# Patient Record
Sex: MALE | Race: WHITE | Employment: FULL TIME | URBAN - METROPOLITAN AREA
[De-identification: names, ages, dates, MRNs, and addresses within clinical notes are randomized per-mention and may not be internally consistent; named-entity substitution may affect disease eponyms.]

---

## 2020-08-19 ENCOUNTER — APPOINTMENT (OUTPATIENT)
Dept: INTERPRETER SERVICES | Facility: CLINIC | Age: 40
End: 2020-08-19
Payer: COMMERCIAL

## 2020-08-25 ENCOUNTER — OFFICE VISIT (OUTPATIENT)
Dept: FAMILY MEDICINE | Facility: CLINIC | Age: 40
End: 2020-08-25
Payer: COMMERCIAL

## 2020-08-25 ENCOUNTER — APPOINTMENT (OUTPATIENT)
Dept: INTERPRETER SERVICES | Facility: CLINIC | Age: 40
End: 2020-08-25
Payer: COMMERCIAL

## 2020-08-25 VITALS
HEART RATE: 79 BPM | TEMPERATURE: 98.2 F | WEIGHT: 224 LBS | BODY MASS INDEX: 31.36 KG/M2 | SYSTOLIC BLOOD PRESSURE: 121 MMHG | OXYGEN SATURATION: 97 % | DIASTOLIC BLOOD PRESSURE: 78 MMHG | HEIGHT: 71 IN

## 2020-08-25 DIAGNOSIS — Z00.00 ROUTINE GENERAL MEDICAL EXAMINATION AT A HEALTH CARE FACILITY: Primary | ICD-10-CM

## 2020-08-25 DIAGNOSIS — R10.13 EPIGASTRIC PAIN: ICD-10-CM

## 2020-08-25 DIAGNOSIS — N50.3 CYST OF EPIDIDYMIS: ICD-10-CM

## 2020-08-25 LAB
ALBUMIN SERPL-MCNC: 4 G/DL (ref 3.4–5)
ALP SERPL-CCNC: 77 U/L (ref 40–150)
ALT SERPL W P-5'-P-CCNC: 63 U/L (ref 0–70)
ANION GAP SERPL CALCULATED.3IONS-SCNC: 7 MMOL/L (ref 3–14)
AST SERPL W P-5'-P-CCNC: 49 U/L (ref 0–45)
BASOPHILS # BLD AUTO: 0 10E9/L (ref 0–0.2)
BASOPHILS NFR BLD AUTO: 0.3 %
BILIRUB SERPL-MCNC: 0.4 MG/DL (ref 0.2–1.3)
BUN SERPL-MCNC: 18 MG/DL (ref 7–30)
CALCIUM SERPL-MCNC: 8.7 MG/DL (ref 8.5–10.1)
CHLORIDE SERPL-SCNC: 109 MMOL/L (ref 94–109)
CHOLEST SERPL-MCNC: 168 MG/DL
CO2 SERPL-SCNC: 25 MMOL/L (ref 20–32)
CREAT SERPL-MCNC: 1.13 MG/DL (ref 0.66–1.25)
DIFFERENTIAL METHOD BLD: ABNORMAL
EOSINOPHIL # BLD AUTO: 0.2 10E9/L (ref 0–0.7)
EOSINOPHIL NFR BLD AUTO: 2.4 %
ERYTHROCYTE [DISTWIDTH] IN BLOOD BY AUTOMATED COUNT: 12.6 % (ref 10–15)
GFR SERPL CREATININE-BSD FRML MDRD: 81 ML/MIN/{1.73_M2}
GLUCOSE SERPL-MCNC: 85 MG/DL (ref 70–99)
HCT VFR BLD AUTO: 39.7 % (ref 40–53)
HDLC SERPL-MCNC: 41 MG/DL
HGB BLD-MCNC: 14.3 G/DL (ref 13.3–17.7)
LDLC SERPL CALC-MCNC: 54 MG/DL
LIPASE SERPL-CCNC: 125 U/L (ref 73–393)
LYMPHOCYTES # BLD AUTO: 2 10E9/L (ref 0.8–5.3)
LYMPHOCYTES NFR BLD AUTO: 28.4 %
MCH RBC QN AUTO: 29.9 PG (ref 26.5–33)
MCHC RBC AUTO-ENTMCNC: 36 G/DL (ref 31.5–36.5)
MCV RBC AUTO: 83 FL (ref 78–100)
MONOCYTES # BLD AUTO: 0.5 10E9/L (ref 0–1.3)
MONOCYTES NFR BLD AUTO: 6.9 %
NEUTROPHILS # BLD AUTO: 4.3 10E9/L (ref 1.6–8.3)
NEUTROPHILS NFR BLD AUTO: 62 %
NONHDLC SERPL-MCNC: 127 MG/DL
PLATELET # BLD AUTO: 269 10E9/L (ref 150–450)
POTASSIUM SERPL-SCNC: 3.9 MMOL/L (ref 3.4–5.3)
PROT SERPL-MCNC: 7.5 G/DL (ref 6.8–8.8)
RBC # BLD AUTO: 4.78 10E12/L (ref 4.4–5.9)
SODIUM SERPL-SCNC: 141 MMOL/L (ref 133–144)
TRIGL SERPL-MCNC: 367 MG/DL
WBC # BLD AUTO: 7 10E9/L (ref 4–11)

## 2020-08-25 PROCEDURE — 99385 PREV VISIT NEW AGE 18-39: CPT | Performed by: FAMILY MEDICINE

## 2020-08-25 PROCEDURE — 80061 LIPID PANEL: CPT | Performed by: FAMILY MEDICINE

## 2020-08-25 PROCEDURE — 85025 COMPLETE CBC W/AUTO DIFF WBC: CPT | Performed by: FAMILY MEDICINE

## 2020-08-25 PROCEDURE — 83690 ASSAY OF LIPASE: CPT | Performed by: FAMILY MEDICINE

## 2020-08-25 PROCEDURE — 36415 COLL VENOUS BLD VENIPUNCTURE: CPT | Performed by: FAMILY MEDICINE

## 2020-08-25 PROCEDURE — 80053 COMPREHEN METABOLIC PANEL: CPT | Performed by: FAMILY MEDICINE

## 2020-08-25 SDOH — HEALTH STABILITY: MENTAL HEALTH: HOW MANY STANDARD DRINKS CONTAINING ALCOHOL DO YOU HAVE ON A TYPICAL DAY?: 3 OR 4

## 2020-08-25 SDOH — HEALTH STABILITY: MENTAL HEALTH: HOW OFTEN DO YOU HAVE A DRINK CONTAINING ALCOHOL?: 2-3 TIMES A WEEK

## 2020-08-25 ASSESSMENT — PAIN SCALES - GENERAL: PAINLEVEL: SEVERE PAIN (6)

## 2020-08-25 ASSESSMENT — MIFFLIN-ST. JEOR: SCORE: 1957.31

## 2020-08-25 NOTE — PATIENT INSTRUCTIONS
Preventive Health Recommendations  Male Ages 26 - 39    Yearly exam:             See your health care provider every year in order to  o   Review health changes.   o   Discuss preventive care.    o   Review your medicines if your doctor has prescribed any.    You should be tested each year for STDs (sexually transmitted diseases), if you re at risk.     After age 35, talk to your provider about cholesterol testing. If you are at risk for heart disease, have your cholesterol tested at least every 5 years.     If you are at risk for diabetes, you should have a diabetes test (fasting glucose).  Shots: Get a flu shot each year. Get a tetanus shot every 10 years.     Nutrition:    Eat at least 5 servings of fruits and vegetables daily.     Eat whole-grain bread, whole-wheat pasta and brown rice instead of white grains and rice.     Get adequate Calcium and Vitamin D.     Lifestyle    Exercise for at least 150 minutes a week (30 minutes a day, 5 days a week). This will help you control your weight and prevent disease.     Limit alcohol to one drink per day.     No smoking.     Wear sunscreen to prevent skin cancer.     See your dentist every six months for an exam and cleaning.     Patient Education     Dolor epigástrico (causa incierta)     El dolor epigástrico es dolor en la parte superior del abdomen. Puede ser un signo de enfermedad. Las causas frecuentes incluyen las siguientes:    Reflujo ácido (el ácido del estómago sube hacia el esófago)    Gastritis (irritación del revestimiento del estómago) La mayoría de las veces, es provocada por la administración de aspirinas o antiinflamatorios no esteroides (SANYA) corey ibuprofeno, por la bacteria H. Pylori o por el consumo frecuente de alcohol.    Úlcera péptica    Inflamación del páncreas    Cálculos biliares    Infección en la vesícula biliar  El dolor puede ser sordo o quemante. Puede irradiarse hacia el pecho o la espalda. Puede curz otros síntomas, corey eructos,  sensación de hinchazón en el estómago, cólicos o dolor de hambre. Puede cruz pérdida de peso o poco apetito, náuseas o vómitos.  Puesto que el diagnóstico de trujillo dolor es incierto, algunas veces se necesitarán pruebas adicionales. En algunos casos el médico tratará la enfermedad más probable para casie si mejora antes de hacer pruebas adicionales.  Cuidados en el hogar  Medicamentos    Los antiácidos ayudan a neutralizar los ácidos típicos del estómago. Si no le gusta la fórmula líquida, puede probar las formas masticables. Podrá notar que unos le funcionan mejor que otros. El uso excesivo puede provocar diarrea o estreñimiento.    Los bloqueadores de ácido (bloqueadores H2) disminuyen la producción de ácido. Ejemplos de esto son cimetidina, famotidina y ranitidina.    Los inhibidores de ácido o inhibidores de la bomba de protones (PPI) disminuyen la producción de ácido de manera diferente de los bloqueadores. Usted puede descubrir que funcionan mejor, chu tardan un poco más en surtir efecto.  Algunos de ellos son omeprazol, lansoprazol, pantoprazol, rabeprazol y esomeprazol. Muchos de estos medicamentos son de venta julissa o se puede conseguir trujillo fórmula genérica.    [place] un antiácido de 30 a 60 minutos después de comer y a la hora de acostarse, chu no a la misma hora que un bloqueador de ácido.    Trate de no rhonda antiinflamatorios no esteroides (SANYA). La aspirina también puede provocar problemas, chu si la antonio para el corazón u otra razón médica, hable con trujillo médico antes de suspenderla; usted no quisiera causar un problema peor, corey un ataque cardíaco o un accidente cerebrovascular.  Alimentación    Si ciertos alimentos parecen provocar dolor, trate de evitarlos. Los síntomas de la gastritis pueden empeorar con ciertas comidas. Limite o evite las comidas grasosas, fritas o muy condimentadas, así corey el café, el chocolate, las mentas y los alimentos muy ácidos corey los tomates o las frutas y los jugos  cítricos (naranja, toronja, jose).    Coma despacio y mastique karen antes de tragar. Los síntomas de la gastritis pueden empeorar con ciertos alimentos.    Evite beber alcohol. [place] puede irritar el estómago.    No ingiera cafeína ni tabaco. [place] puede retrasar la recuperación y empeorar el problema.    Trate de comer comidas pequeñas con bocadillos entre cate y otra.    No coma otto 2 o 3 horas antes de acostarse.    [place] la cabecera de la cama si sufre síntomas otto la noche. [place] eduar que los ácidos se acumulen en el esófago.  Visita de control  Programe cate visita de control con trujillo proveedor de atención médica, o según lo que se le haya indicado.  Cuándo buscar atención médica  Llame a trujillo proveedor de atención médica de inmediato ante cualquiera de los siguientes síntomas:    Dolor de estómago que empeora o se mueve hacia el lado derecho inferior del abdomen    Aparece dolor de pecho, o si el dolor empeora o se extiende hacia el pecho, la espalda, el manish, el hombro o el brazo    Vómitos frecuentes (no puede retener líquidos en el estómago)    Jerry en las heces o en el vómito (color rojizo o negruzco)    Siente debilidad o mareos, se desmaya o tiene problemas para respirar    Fiebre de 100,4  F (38  C) o más francine, o según lo que le haya indicado trujillo proveedor de atención médica    Hinchazón del abdomen  Date Last Reviewed: 3/1/2018    [id number] [employer]. [address] [place] [address], [place] [address]. Todos los derechos reservados. Esta información no pretende sustituir la atención médica profesional. Sólo trujillo médico puede diagnosticar y tratar un problema de shari.           Patient Education     Epididimitis [Epididymitis]  El dolor y la hinchazón en trujillo escroto se deben a cate inflamación del epidídimo, el cual es un pequeño saco que se encuentra junto al testículo y es donde se almacena la esperma. A menudo, se debe a cate infección. En hombres sexualmente activos, suele deberse a cate  enfermedad de transmisión sexual (ETS), katie corey la clamidiosis o la gonorrea. En los niños y los hombres mayores que no son sexualmente activos, esta enfermedad se debe a bacterias provenientes de la vejiga o la glándula prostática (no se trata de cate enfermedad de transmisión sexual).  Los síntomas pueden comenzar con dolor en la parte baja del abdomen o con dolor en la parte baja de la espalda que se esparce hacia el escroto. Por lo general, sólo un lado del cuerpo se ve afectado. El testículo y el escroto se hinchan y eso causa mucho dolor. También puede tener fiebre, y ardor al orinar. En ocasiones, puede cruz secreción en el pene.  El tratamiento consiste en antibióticos, antiinflamatorios y calmantes (analgésicos). Debería sentir alivio de los síntomas en los primeros días de tratamiento, chu pasarán algunas semanas antes de que la hinchazón y el malestar desaparezcan. Si se sospecha que cate enfermedad de transmisión sexual puede ser la causa, trujillo betsey también debe recibir tratamiento.  Cuidados En [place]:    El escroto debe estar siempre karen sostenido: Al acostarse, coloque cate toalla enrollada debajo del escroto. Para caminar, use un suspensorio atlético o colóquese dos calzoncillos juntos.    Para aliviar el dolor, aplique compresas de hielo (hielo en cate bolsa plástica, envuelta en cate toalla) sobre la geovany inflamada.    Puede usar acetaminofén (Tylenol) o ibuprofeno (Motrin o Advil) para controlar el dolor, a menos que le hayan recetado otro medicamento. [ NOTA : Si tiene cate enfermedad hepática o renal crónica, o ha tenido alguna vez cate úlcera estomacal o sangrado gastrointestinal, consulte con trujillo médico antes de rhonda estos medicamentos.]    Descanse en la cama hasta que la fiebre, el dolor y la hinchazón se detengan. La hinchazón puede tardar varias semanas en desaparecer. No tome alcohol, té, café ni bebidas gaseosas, dado que eso podría empeorar los síntomas.    Evite el estreñimiento, dado  que lo obligará a hacer más esfuerzo y eso aumentará el dolor. Para evitarlo, coma alimentos laxantes naturales, tales corey ciruelas secas, frutas frescas y cereales integrales. De ser necesario, puede usar un laxante suave de venta sin receta (leche de magnesia) para tratar el estreñimiento. También puede usar aceite mineral para que la materia fecal sea siempre blanda.    Evite mantener relaciones sexuales hasta cruz terminado el tratamiento y que todos los síntomas hayan desaparecido.    [place] el medicamento según le hayan indicado. No se saltee ninguna dosis ni deje de tomarlo antes de terminarlo aunque se sienta mejor.  Visita de control  con trujillo médico, un urólogo o según le indique nuestro personal médico, para comprobar que usted esté respondiendo adecuadamente al tratamiento. Si le tomaron cate muestra para hacer un cultivo, puede llamarnos para obtener los resultados. Cate prueba de cultivo sirve para asegurar que está tomando el antibiótico correcto.  Busque Prontamente Atención Médica  si algo de lo siguiente ocurre:    Fiebre superior a los 100.4  F (38.0  C) después de joya días de tratamiento.    [place] hinchazón o dolor en el testículo después de cruz comenzado el tratamiento.    Siente más presión o dolor en la vejiga.    No ha podido orinar en 8 horas.  Date Last Reviewed: 9/1/2016 2000-2019 [employer]. [address] [place] [address], [place] [address]. Todos los derechos reservados. Esta información no pretende sustituir la atención médica profesional. Sólo trujillo médico puede diagnosticar y tratar un problema de shari.

## 2020-08-25 NOTE — PROGRESS NOTES
3  SUBJECTIVE:   CC: Leonidas Hawk is an 40 year old male who presents for preventive health visit.     Healthy Habits:    Do you get at least three servings of calcium containing foods daily (dairy, green leafy vegetables, etc.)? No    Amount of exercise or daily activities, outside of work: 0 day(s) per week    Problems taking medications regularly No    Medication side effects: No    Have you had an eye exam in the past two years? no    Do you see a dentist twice per year? no    Do you have sleep apnea, excessive snoring or daytime drowsiness?Snoring a lot, sleepy    Abdominal/Flank Pain  Duration of complaint: 5 years, middle of stomach . Very bad sharp pain, feels like it radiates into his back and spine hurts. Does not take anything for this, does not know what to take. No testing has been done for abdomen.    Description:   Character: Sharp  Location: epigastric region  Radiation: None and Back  Intensity: severe  Progression of Symptoms:  worsening  Accompanying Signs & Symptoms:  Fever/Chills: no   Gas/Bloating: no   Nausea: YES  Vomitting: YES  Diarrhea: no   Dysuria or Hematuria: no   History:   Trauma: no   Previous similar pain: YES- for about 5 years, becoming more common, has never seen a doctor for this.   Previous tests done: none  Precipitating factors:   Does the pain change with:     Food: YES     BM: no     Urination: no   Alleviating factors: none  Therapies Tried and outcome: none  LMP:  not applicable    Today's PHQ-2 Score:   PHQ-2 ( 1999 [employer]) 8/25/2020   Q1: Little interest or pleasure in doing things 0   Q2: Feeling down, depressed or hopeless 0   PHQ-2 Score 0       Abuse: Current or Past(Physical, Sexual or Emotional)- No  Do you feel safe in your environment? Yes        Social History     Tobacco Use     Smoking status: Current Some Day Smoker     Packs/day: 0.10     Smokeless tobacco: Never Used   Substance Use Topics     Alcohol use: Yes     Frequency: 2-3 times a week      Drinks per session: 3 or 4     If you drink alcohol do you typically have >3 drinks per day or >7 drinks per week? Saundra Costello MA    Patient was scheduled for routine physical today.  We attempted to do all of that in addition to dealing with his chronic abdominal pain as noted above.  He also had a list of other concerns but due to the brevity of the visit and the need to work through an  some of those were deferred to a future visit.    The abdominal pain is epigastric.  It may occur once per week is very sharp in nature.  It generally does not wake him up at night.  Is occasional vomiting.  His weight is remained stable over this time.  He has occasional diarrhea and feels that there may be some blood in the stool but it is difficult to characterize.  Eating seems to potentially increase the discomfort.  He is currently on some amoxicillin and will be finishing up that shortly after having had an infected tooth removed in [place] last week.  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    He also has a lump on the left testicle that is intermittently painful.  He is also interested in a vasectomy.    He also mention some skin changes on his face.  I see some acne-like changes on his forehead but I do not really appreciate any other rashes or skin changes in the lower face where he reports some changes as well.                        Last PSA: No results found for: PSA    Reviewed orders with patient. Reviewed health maintenance and updated orders accordingly - Yes  There is no problem list on file for this patient.    Past Surgical History:   Procedure Laterality Date     APPENDECTOMY  2000     DENTAL SURGERY         Social History     Tobacco Use     Smoking status: Current Some Day Smoker     Packs/day: 0.10     Smokeless tobacco: Never Used   Substance Use Topics     Alcohol use: Yes     Frequency: 2-3 times a week      "Drinks per session: 3 or 4     Family History   Problem Relation Age of Onset     Cancer Mother         stomach, throat     Other - See Comments Father         hit by car and          Current Outpatient Medications   Medication Sig Dispense Refill     AMOXICILLIN PO Was prescribed this after dental procedure       omeprazole (PRILOSEC) 20 MG DR capsule Take 1 capsule (20 mg) by mouth daily 30 capsule 1     No Known Allergies    Reviewed and updated as needed this visit by clinical staff  Tobacco  Allergies  Meds  Problems  Med Hx  Surg Hx  Fam Hx  Soc Hx          Reviewed and updated as needed this visit by Provider  Tobacco  Meds  Problems  Med Hx  Surg Hx  Fam Hx  Soc Hx           ROS:  CONSTITUTIONAL: NEGATIVE for fever, chills, change in weight  INTEGUMENTARY/SKIN: NEGATIVE for worrisome rashes, moles or lesions  EYES: NEGATIVE for vision changes or irritation  ENT: NEGATIVE for ear, mouth and throat problems  RESP: NEGATIVE for significant cough or SOB  CV: NEGATIVE for chest pain, palpitations or peripheral edema  GI: NEGATIVE for nausea, abdominal pain, heartburn, or change in bowel habits   male: negative for dysuria, hematuria, decreased urinary stream, erectile dysfunction, urethral discharge  MUSCULOSKELETAL: NEGATIVE for significant arthralgias or myalgia  NEURO: NEGATIVE for weakness, dizziness or paresthesias  PSYCHIATRIC: NEGATIVE for changes in mood or affect    OBJECTIVE:   /78 (BP Location: Right arm, Patient Position: Chair, Cuff Size: Adult Regular)   Pulse 79   Temp 98.2  F (36.8  C) (Oral)   Ht 1.81 m (5' 11.26\")   Wt 101.6 kg (224 lb)   SpO2 97%   BMI 31.01 kg/m    EXAM:  GENERAL: healthy, alert and no distress  EYES: Eyes grossly normal to inspection, PERRL and conjunctivae and sclerae normal  HENT: ear canals and TM's normal, nose and mouth without ulcers or lesions  NECK: no adenopathy, no asymmetry, masses, or scars and thyroid normal to palpation  RESP: " lungs clear to auscultation - no rales, rhonchi or wheezes  CV: regular rate and rhythm, normal S1 S2, no S3 or S4, no murmur, click or rub, no peripheral edema and peripheral pulses strong  ABDOMEN: tenderness epigastric, no organomegaly or masses, liver span normal to percussion, bowel sounds normal, no palpable or pulsatile masses, umbilicus normal and well-healed incision both midline and right lower quadrant consistent with his reported history of an appendectomy that was ruptured and required more aggressive surgical management.   (male): normal male genitalia without lesions or urethral discharge, no hernia   (male): testicles normal without atrophy or masses, no hernias, penis normal without urethral discharge and the lump of concern seems to correspond to an epididymal cyst.  MS: no gross musculoskeletal defects noted, no edema  SKIN: no suspicious lesions or rashes  NEURO: Normal strength and tone, mentation intact and speech normal  PSYCH: mentation appears normal, affect normal/bright    Diagnostic Test Results:  Labs reviewed in Epic    ASSESSMENT/PLAN:   1. Routine general medical examination at a health care facility  Routine health issues were briefly reviewed.  Information was given to him in French.  Lipid panel was checked today.  - Lipid panel reflex to direct LDL Non-fasting    2. Epigastric pain  I guess my concern would be an ulcer even though the history is not entirely typical.  Laboratory studies will be checked as noted.  May consider imaging in the form of ultrasound to evaluate the gallbladder although he was not tender over the gallbladder.  Initially have recommended starting Prilosec.  Short-term follow-up in a few weeks for recheck and review of the tests is recommended.  - CBC with platelets and differential  - Comprehensive metabolic panel (BMP + Alb, Alk Phos, ALT, AST, Total. Bili, TP)  - Lipase  - Helicobacter pylori Antigen Stool; Future  - omeprazole (PRILOSEC) 20 MG   "capsule; Take 1 capsule (20 mg) by mouth daily  Dispense: 30 capsule; Refill: 1    3. Cyst of epididymis  No testicular pathology was noted.  The area of discomfort seems to correspond to an epididymal cyst.      COUNSELING:  Reviewed preventive health counseling, as reflected in patient instructions       Regular exercise       Healthy diet/nutrition       Vision screening       Hearing screening    Estimated body mass index is 31.01 kg/m  as calculated from the following:    Height as of this encounter: 1.81 m (5' 11.26\").    Weight as of this encounter: 101.6 kg (224 lb).    Weight management plan: Discussed healthy diet and exercise guidelines     reports that he has been smoking. He has been smoking about 0.10 packs per day. He has never used smokeless tobacco.  Tobacco Cessation Action Plan: Information offered: Patient not interested at this time    Counseling Resources:  ATP IV Guidelines  Pooled Cohorts Equation Calculator  FRAX Risk Assessment  ICSI Preventive Guidelines  Dietary Guidelines for Americans, 2010  USDA's MyPlate  ASA Prophylaxis  Lung CA Screening    Hector Bunch MD  [facility]  "

## 2020-08-26 PROCEDURE — 87338 HPYLORI STOOL AG IA: CPT | Performed by: FAMILY MEDICINE

## 2020-08-27 DIAGNOSIS — R10.13 EPIGASTRIC PAIN: ICD-10-CM

## 2020-08-28 LAB — H PYLORI AG STL QL IA: NEGATIVE

## 2020-09-12 ENCOUNTER — APPOINTMENT (OUTPATIENT)
Dept: ULTRASOUND IMAGING | Facility: CLINIC | Age: 40
End: 2020-09-12
Attending: EMERGENCY MEDICINE
Payer: COMMERCIAL

## 2020-09-12 ENCOUNTER — HOSPITAL ENCOUNTER (INPATIENT)
Facility: CLINIC | Age: 40
LOS: 3 days | Discharge: HOME | End: 2020-09-15
Attending: EMERGENCY MEDICINE | Admitting: SURGERY
Payer: COMMERCIAL

## 2020-09-12 DIAGNOSIS — K80.10 CALCULUS OF GALLBLADDER WITH CHRONIC CHOLECYSTITIS WITHOUT OBSTRUCTION: Primary | ICD-10-CM

## 2020-09-12 DIAGNOSIS — Z20.822 COVID-19 RULED OUT BY LABORATORY TESTING: ICD-10-CM

## 2020-09-12 DIAGNOSIS — K80.71 CALCULUS OF GALLBLADDER AND BILE DUCT WITH OBSTRUCTION WITHOUT CHOLECYSTITIS: ICD-10-CM

## 2020-09-12 PROBLEM — K80.20 CHOLELITHIASIS: Status: ACTIVE | Noted: 2020-09-12

## 2020-09-12 LAB
ALBUMIN SERPL-MCNC: 4.4 G/DL (ref 3.4–5)
ALBUMIN UR-MCNC: 10 MG/DL
ALP SERPL-CCNC: 82 U/L (ref 40–150)
ALT SERPL W P-5'-P-CCNC: 72 U/L (ref 0–70)
ANION GAP SERPL CALCULATED.3IONS-SCNC: 8 MMOL/L (ref 3–14)
APPEARANCE UR: CLEAR
AST SERPL W P-5'-P-CCNC: 37 U/L (ref 0–45)
BASOPHILS # BLD AUTO: 0 10E9/L (ref 0–0.2)
BASOPHILS NFR BLD AUTO: 0.1 %
BILIRUB SERPL-MCNC: 0.5 MG/DL (ref 0.2–1.3)
BILIRUB UR QL STRIP: NEGATIVE
BUN SERPL-MCNC: 14 MG/DL (ref 7–30)
CALCIUM SERPL-MCNC: 9.1 MG/DL (ref 8.5–10.1)
CHLORIDE SERPL-SCNC: 106 MMOL/L (ref 94–109)
CO2 SERPL-SCNC: 24 MMOL/L (ref 20–32)
COLOR UR AUTO: YELLOW
CREAT SERPL-MCNC: 0.91 MG/DL (ref 0.66–1.25)
DIFFERENTIAL METHOD BLD: NORMAL
EOSINOPHIL # BLD AUTO: 0.1 10E9/L (ref 0–0.7)
EOSINOPHIL NFR BLD AUTO: 0.9 %
ERYTHROCYTE [DISTWIDTH] IN BLOOD BY AUTOMATED COUNT: 11.9 % (ref 10–15)
GFR SERPL CREATININE-BSD FRML MDRD: >90 ML/MIN/{1.73_M2}
GLUCOSE SERPL-MCNC: 109 MG/DL (ref 70–99)
GLUCOSE UR STRIP-MCNC: NEGATIVE MG/DL
HCT VFR BLD AUTO: 45.7 % (ref 40–53)
HGB BLD-MCNC: 16.5 G/DL (ref 13.3–17.7)
HGB UR QL STRIP: NEGATIVE
HYALINE CASTS #/AREA URNS LPF: 1 /LPF (ref 0–2)
IMM GRANULOCYTES # BLD: 0 10E9/L (ref 0–0.4)
IMM GRANULOCYTES NFR BLD: 0.2 %
INTERPRETATION ECG - MUSE: NORMAL
KETONES UR STRIP-MCNC: NEGATIVE MG/DL
LACTATE BLD-SCNC: 2.1 MMOL/L (ref 0.7–2)
LEUKOCYTE ESTERASE UR QL STRIP: NEGATIVE
LIPASE SERPL-CCNC: 120 U/L (ref 73–393)
LYMPHOCYTES # BLD AUTO: 1.7 10E9/L (ref 0.8–5.3)
LYMPHOCYTES NFR BLD AUTO: 18.4 %
MCH RBC QN AUTO: 30.2 PG (ref 26.5–33)
MCHC RBC AUTO-ENTMCNC: 36.1 G/DL (ref 31.5–36.5)
MCV RBC AUTO: 84 FL (ref 78–100)
MONOCYTES # BLD AUTO: 0.4 10E9/L (ref 0–1.3)
MONOCYTES NFR BLD AUTO: 4.1 %
MUCOUS THREADS #/AREA URNS LPF: PRESENT /LPF
NEUTROPHILS # BLD AUTO: 7 10E9/L (ref 1.6–8.3)
NEUTROPHILS NFR BLD AUTO: 76.3 %
NITRATE UR QL: NEGATIVE
NRBC # BLD AUTO: 0 10*3/UL
NRBC BLD AUTO-RTO: 0 /100
PH UR STRIP: 6.5 PH (ref 5–7)
PLATELET # BLD AUTO: 280 10E9/L (ref 150–450)
POTASSIUM SERPL-SCNC: 4 MMOL/L (ref 3.4–5.3)
PROT SERPL-MCNC: 8.5 G/DL (ref 6.8–8.8)
RBC # BLD AUTO: 5.47 10E12/L (ref 4.4–5.9)
RBC #/AREA URNS AUTO: 2 /HPF (ref 0–2)
SODIUM SERPL-SCNC: 138 MMOL/L (ref 133–144)
SOURCE: ABNORMAL
SP GR UR STRIP: 1.02 (ref 1–1.03)
TROPONIN I SERPL-MCNC: <0.015 UG/L (ref 0–0.04)
UROBILINOGEN UR STRIP-MCNC: NORMAL MG/DL (ref 0–2)
WBC # BLD AUTO: 9.1 10E9/L (ref 4–11)
WBC #/AREA URNS AUTO: 1 /HPF (ref 0–5)

## 2020-09-12 PROCEDURE — 83690 ASSAY OF LIPASE: CPT | Performed by: EMERGENCY MEDICINE

## 2020-09-12 PROCEDURE — 25800030 ZZH RX IP 258 OP 636: Performed by: STUDENT IN AN ORGANIZED HEALTH CARE EDUCATION/TRAINING PROGRAM

## 2020-09-12 PROCEDURE — 96376 TX/PRO/DX INJ SAME DRUG ADON: CPT | Performed by: EMERGENCY MEDICINE

## 2020-09-12 PROCEDURE — 25000128 H RX IP 250 OP 636: Performed by: EMERGENCY MEDICINE

## 2020-09-12 PROCEDURE — 96375 TX/PRO/DX INJ NEW DRUG ADDON: CPT | Performed by: EMERGENCY MEDICINE

## 2020-09-12 PROCEDURE — 96361 HYDRATE IV INFUSION ADD-ON: CPT | Performed by: EMERGENCY MEDICINE

## 2020-09-12 PROCEDURE — 25800030 ZZH RX IP 258 OP 636: Performed by: EMERGENCY MEDICINE

## 2020-09-12 PROCEDURE — 84484 ASSAY OF TROPONIN QUANT: CPT | Performed by: EMERGENCY MEDICINE

## 2020-09-12 PROCEDURE — 12000001 ZZH R&B MED SURG/OB UMMC

## 2020-09-12 PROCEDURE — 80053 COMPREHEN METABOLIC PANEL: CPT | Performed by: EMERGENCY MEDICINE

## 2020-09-12 PROCEDURE — 81001 URINALYSIS AUTO W/SCOPE: CPT | Performed by: EMERGENCY MEDICINE

## 2020-09-12 PROCEDURE — 85025 COMPLETE CBC W/AUTO DIFF WBC: CPT | Performed by: EMERGENCY MEDICINE

## 2020-09-12 PROCEDURE — 96374 THER/PROPH/DIAG INJ IV PUSH: CPT | Performed by: EMERGENCY MEDICINE

## 2020-09-12 PROCEDURE — 83605 ASSAY OF LACTIC ACID: CPT | Performed by: EMERGENCY MEDICINE

## 2020-09-12 PROCEDURE — 25000128 H RX IP 250 OP 636: Performed by: STUDENT IN AN ORGANIZED HEALTH CARE EDUCATION/TRAINING PROGRAM

## 2020-09-12 PROCEDURE — 76700 US EXAM ABDOM COMPLETE: CPT

## 2020-09-12 PROCEDURE — C9803 HOPD COVID-19 SPEC COLLECT: HCPCS | Performed by: EMERGENCY MEDICINE

## 2020-09-12 PROCEDURE — 99285 EMERGENCY DEPT VISIT HI MDM: CPT | Mod: Z6 | Performed by: EMERGENCY MEDICINE

## 2020-09-12 PROCEDURE — 99285 EMERGENCY DEPT VISIT HI MDM: CPT | Mod: 25 | Performed by: EMERGENCY MEDICINE

## 2020-09-12 RX ORDER — ONDANSETRON 2 MG/ML
4 INJECTION INTRAMUSCULAR; INTRAVENOUS ONCE
Status: COMPLETED | OUTPATIENT
Start: 2020-09-12 | End: 2020-09-12

## 2020-09-12 RX ORDER — HYDROMORPHONE HYDROCHLORIDE 1 MG/ML
.3-.5 INJECTION, SOLUTION INTRAMUSCULAR; INTRAVENOUS; SUBCUTANEOUS
Status: DISCONTINUED | OUTPATIENT
Start: 2020-09-12 | End: 2020-09-15

## 2020-09-12 RX ORDER — POTASSIUM CHLORIDE 1.5 G/1.58G
20-40 POWDER, FOR SOLUTION ORAL
Status: DISCONTINUED | OUTPATIENT
Start: 2020-09-12 | End: 2020-09-15 | Stop reason: HOSPADM

## 2020-09-12 RX ORDER — HYDROMORPHONE HYDROCHLORIDE 1 MG/ML
0.5 INJECTION, SOLUTION INTRAMUSCULAR; INTRAVENOUS; SUBCUTANEOUS ONCE
Status: COMPLETED | OUTPATIENT
Start: 2020-09-12 | End: 2020-09-12

## 2020-09-12 RX ORDER — NALOXONE HYDROCHLORIDE 0.4 MG/ML
.1-.4 INJECTION, SOLUTION INTRAMUSCULAR; INTRAVENOUS; SUBCUTANEOUS
Status: DISCONTINUED | OUTPATIENT
Start: 2020-09-12 | End: 2020-09-15 | Stop reason: HOSPADM

## 2020-09-12 RX ORDER — PROCHLORPERAZINE 25 MG
25 SUPPOSITORY, RECTAL RECTAL EVERY 12 HOURS PRN
Status: DISCONTINUED | OUTPATIENT
Start: 2020-09-12 | End: 2020-09-15 | Stop reason: HOSPADM

## 2020-09-12 RX ORDER — PROCHLORPERAZINE MALEATE 5 MG
10 TABLET ORAL EVERY 6 HOURS PRN
Status: DISCONTINUED | OUTPATIENT
Start: 2020-09-12 | End: 2020-09-15 | Stop reason: HOSPADM

## 2020-09-12 RX ORDER — SODIUM CHLORIDE 9 MG/ML
INJECTION, SOLUTION INTRAVENOUS CONTINUOUS
Status: DISCONTINUED | OUTPATIENT
Start: 2020-09-12 | End: 2020-09-13

## 2020-09-12 RX ORDER — POTASSIUM CHLORIDE 750 MG/1
20-40 TABLET, EXTENDED RELEASE ORAL
Status: DISCONTINUED | OUTPATIENT
Start: 2020-09-12 | End: 2020-09-15 | Stop reason: HOSPADM

## 2020-09-12 RX ORDER — POTASSIUM CHLORIDE 29.8 MG/ML
20 INJECTION INTRAVENOUS
Status: DISCONTINUED | OUTPATIENT
Start: 2020-09-12 | End: 2020-09-15 | Stop reason: HOSPADM

## 2020-09-12 RX ORDER — HYDROMORPHONE HYDROCHLORIDE 1 MG/ML
0.5 INJECTION, SOLUTION INTRAMUSCULAR; INTRAVENOUS; SUBCUTANEOUS
Status: COMPLETED | OUTPATIENT
Start: 2020-09-12 | End: 2020-09-12

## 2020-09-12 RX ORDER — SODIUM CHLORIDE, SODIUM LACTATE, POTASSIUM CHLORIDE, CALCIUM CHLORIDE 600; 310; 30; 20 MG/100ML; MG/100ML; MG/100ML; MG/100ML
1000 INJECTION, SOLUTION INTRAVENOUS CONTINUOUS
Status: DISCONTINUED | OUTPATIENT
Start: 2020-09-12 | End: 2020-09-15 | Stop reason: HOSPADM

## 2020-09-12 RX ORDER — ONDANSETRON 2 MG/ML
4 INJECTION INTRAMUSCULAR; INTRAVENOUS EVERY 6 HOURS PRN
Status: DISCONTINUED | OUTPATIENT
Start: 2020-09-12 | End: 2020-09-15 | Stop reason: HOSPADM

## 2020-09-12 RX ORDER — POTASSIUM CL/LIDO/0.9 % NACL 10MEQ/0.1L
10 INTRAVENOUS SOLUTION, PIGGYBACK (ML) INTRAVENOUS
Status: DISCONTINUED | OUTPATIENT
Start: 2020-09-12 | End: 2020-09-15 | Stop reason: HOSPADM

## 2020-09-12 RX ORDER — ACETAMINOPHEN 325 MG/1
975 TABLET ORAL 3 TIMES DAILY
Status: DISCONTINUED | OUTPATIENT
Start: 2020-09-13 | End: 2020-09-15 | Stop reason: HOSPADM

## 2020-09-12 RX ORDER — POTASSIUM CHLORIDE 7.45 MG/ML
10 INJECTION INTRAVENOUS
Status: DISCONTINUED | OUTPATIENT
Start: 2020-09-12 | End: 2020-09-15 | Stop reason: HOSPADM

## 2020-09-12 RX ORDER — ONDANSETRON 4 MG/1
4 TABLET, ORALLY DISINTEGRATING ORAL EVERY 6 HOURS PRN
Status: DISCONTINUED | OUTPATIENT
Start: 2020-09-12 | End: 2020-09-15 | Stop reason: HOSPADM

## 2020-09-12 RX ORDER — MAGNESIUM SULFATE HEPTAHYDRATE 40 MG/ML
4 INJECTION, SOLUTION INTRAVENOUS EVERY 4 HOURS PRN
Status: DISCONTINUED | OUTPATIENT
Start: 2020-09-12 | End: 2020-09-15 | Stop reason: HOSPADM

## 2020-09-12 RX ADMIN — SODIUM CHLORIDE: 9 INJECTION, SOLUTION INTRAVENOUS at 14:35

## 2020-09-12 RX ADMIN — ONDANSETRON 4 MG: 2 INJECTION INTRAMUSCULAR; INTRAVENOUS at 13:39

## 2020-09-12 RX ADMIN — SODIUM CHLORIDE: 9 INJECTION, SOLUTION INTRAVENOUS at 19:20

## 2020-09-12 RX ADMIN — HYDROMORPHONE HYDROCHLORIDE 0.3 MG: 1 INJECTION, SOLUTION INTRAMUSCULAR; INTRAVENOUS; SUBCUTANEOUS at 22:39

## 2020-09-12 RX ADMIN — SODIUM CHLORIDE 1000 ML: 9 INJECTION, SOLUTION INTRAVENOUS at 13:50

## 2020-09-12 RX ADMIN — SODIUM CHLORIDE, POTASSIUM CHLORIDE, SODIUM LACTATE AND CALCIUM CHLORIDE 1000 ML: 600; 310; 30; 20 INJECTION, SOLUTION INTRAVENOUS at 23:31

## 2020-09-12 RX ADMIN — HYDROMORPHONE HYDROCHLORIDE 0.5 MG: 1 INJECTION, SOLUTION INTRAMUSCULAR; INTRAVENOUS; SUBCUTANEOUS at 14:36

## 2020-09-12 RX ADMIN — HYDROMORPHONE HYDROCHLORIDE 0.5 MG: 1 INJECTION, SOLUTION INTRAMUSCULAR; INTRAVENOUS; SUBCUTANEOUS at 13:39

## 2020-09-12 ASSESSMENT — ENCOUNTER SYMPTOMS
NAUSEA: 1
CHILLS: 0
FEVER: 0
SHORTNESS OF BREATH: 0
ABDOMINAL PAIN: 1
ANAL BLEEDING: 1
VOMITING: 0
ACTIVITY CHANGE: 0

## 2020-09-12 NOTE — ED PROVIDER NOTES
[facility] EMERGENCY DEPARTMENT ([facility])     September 12, 2020    History     Chief Complaint   Patient presents with     Abdominal Pain     2-3 days worsening sharp pain radiating towards back     Rectal Bleeding     3 days ago     HPI  Leonidas Hawk is a 40 year old male w/ PMH of appendectomy (2000), who presents to the Emergency Department with epigastric abdominal pain and nausea.  Patient was interviewed with a  over the phone.  Patient reports that he has pain at the top of the stomach that radiates into his back and is sharp.  He has had this pain over the past year but due to insurance issues was not able to have it checked out but since yesterday afternoon the pain has been severe and constant.  He denies any alleviating or aggravating factors associated with the abdominal pain.  He states that he does have nausea with it and has had difficulty eating but has not vomited.  He denies fevers or chills, no chest pain or shortness of breath.  He has not had diarrhea and states that he has not had black or tarry stools.  He states that for the past year of bright red blood per rectum that is not mixed in with the stool is present when wiping with toilet paper.  Last episode of this was 2 days ago.  He states that he was prescribed Prilosec during his PCP visit 2 weeks ago and states that he has been taking this as prescribed, prior to meals.  States that he had never taken this medication in the past.  He denies excess alcohol consumption.  He denies aspirin or ibuprofen use.  He denies a history of diabetes and does not report any other medical issues at this time.  He denies any known medical allergies.  He states that he did have an appendectomy in 2000 for breast appendix but otherwise no surgeries.  States that he drove himself here today.    Patient was seen in primary care clinic at [place] with report of epigastric abdominal pain.  Patient had labs drawn  including CBC, CMP, lipase, H. pylori all of which were WNL.  Patient to have an elevated triglycerides of 367 on lipid panel.  He was started on Prilosec.  At that time the concern was a possible ulcer.  He did not have tenderness over the gallbladder on exam and no imaging at that time.    PAST MEDICAL HISTORY: History reviewed. No pertinent past medical history.    PAST SURGICAL HISTORY:   Past Surgical History:   Procedure Laterality Date     APPENDECTOMY       DENTAL SURGERY         Past medical history, past surgical history, medications, and allergies were reviewed with the patient. Additional pertinent items: None    FAMILY HISTORY:   Family History   Problem Relation Age of Onset     Cancer Mother         stomach, throat     Other - See Comments Father         hit by car and        SOCIAL HISTORY:   Social History     Tobacco Use     Smoking status: Current Some Day Smoker     Packs/day: 0.10     Smokeless tobacco: Never Used   Substance Use Topics     Alcohol use: Yes     Frequency: 2-3 times a week     Drinks per session: 3 or 4     Social history was reviewed with the patient. Additional pertinent items: None      Patient's Medications   New Prescriptions    No medications on file   Previous Medications    AMOXICILLIN PO    Was prescribed this after dental procedure    OMEPRAZOLE (PRILOSEC) 20 MG DR CAPSULE    Take 1 capsule (20 mg) by mouth daily   Modified Medications    No medications on file   Discontinued Medications    No medications on file        No Known Allergies     Review of Systems   Constitutional: Negative for activity change, chills and fever.   Respiratory: Negative for shortness of breath.    Cardiovascular: Negative for chest pain.   Gastrointestinal: Positive for abdominal pain (epigastric w/ back radiation), anal bleeding (1 year, last episode 2 days ago) and nausea. Negative for vomiting.   All other systems reviewed and are negative.    A complete review of systems was  performed with pertinent positives and negatives noted in the HPI, and all other systems negative.    Physical Exam   BP: (!) 148/105  Pulse: 62  Temp: 97.9  F (36.6  C)  Resp: 16  SpO2: 97 %      Physical Exam  Vitals signs and nursing note reviewed.   Constitutional:       General: He is in acute distress.      Appearance: He is ill-appearing and diaphoretic. He is not toxic-appearing.   HENT:      Head: Normocephalic and atraumatic.   Eyes:      General: No scleral icterus.     Extraocular Movements: Extraocular movements intact.   Cardiovascular:      Rate and Rhythm: Normal rate and regular rhythm.   Pulmonary:      Effort: Pulmonary effort is normal.      Breath sounds: Normal breath sounds.   Abdominal:      Palpations: Abdomen is soft.      Tenderness: There is abdominal tenderness in the right upper quadrant and epigastric area. There is guarding. Positive signs include Kelly's sign.      Comments: Old scar linear   Skin:     General: Skin is warm.   Neurological:      General: No focal deficit present.      Mental Status: He is alert.   Psychiatric:         Mood and Affect: Mood is anxious.         ED Course   12:54 PM  The patient was interviewed by Jordyn Allen MD in Room ED18.       Procedures           Medications   sodium chloride 0.9% infusion ( Intravenous New Bag 9/12/20 1435)   HYDROmorphone (PF) (DILAUDID) injection 0.5 mg (0.5 mg Intravenous Given 9/12/20 1339)   ondansetron (ZOFRAN) injection 4 mg (4 mg Intravenous Given 9/12/20 1339)   0.9% sodium chloride BOLUS (0 mLs Intravenous Stopped 9/12/20 1435)   HYDROmorphone (PF) (DILAUDID) injection 0.5 mg (0.5 mg Intravenous Given 9/12/20 1436)      Results for orders placed or performed during the hospital encounter of 09/12/20   Abdomen US, complete     Status: None    Narrative    US ABDOMEN COMPLETE 9/12/2020 3:16 PM    CLINICAL HISTORY: several abdominal pain epigastric ruq region.  ?  gallbladder, pancreas, aorta/kidney pathology?, see  above.    TECHNIQUE: Complete abdominal ultrasound.    COMPARISON: None.    FINDINGS:    GALLBLADDER: Shadowing calculus in the gallbladder neck. No  gallbladder wall thickening or pericholecystic fluid. Sonographic  Kelly sign was negative.    BILE DUCTS: No biliary dilatation. The common duct measures 5 mm.    LIVER: Increased echogenicity from diffuse fatty infiltration.  Hepatomegaly. No focal mass.    RIGHT KIDNEY: Normal size. Normal echogenicity with no hydronephrosis  or mass.     LEFT KIDNEY: Normal size. Normal echogenicity with no hydronephrosis  or mass.     SPLEEN: Normal.    PANCREAS: The pancreatic head is within normal limits. The body and  tail are obscured by overlying bowel gas.    AORTA: Normal in caliber.     IVC: Normal where visualized.    No ascites.      Impression    IMPRESSION:  1.  Cholelithiasis with a stone in the gallbladder neck. No  sonographic evidence for acute cholecystitis.  2.  Hepatic steatosis.    TANIA SALAMANCA MD   CBC with platelets differential     Status: None   Result Value Ref Range    WBC 9.1 4.0 - 11.0 10e9/L    RBC Count 5.47 4.4 - 5.9 10e12/L    Hemoglobin 16.5 13.3 - 17.7 g/dL    Hematocrit 45.7 40.0 - 53.0 %    MCV 84 78 - 100 fl    MCH 30.2 26.5 - 33.0 pg    MCHC 36.1 31.5 - 36.5 g/dL    RDW 11.9 10.0 - 15.0 %    Platelet Count 280 150 - 450 10e9/L    Diff Method Automated Method     % Neutrophils 76.3 %    % Lymphocytes 18.4 %    % Monocytes 4.1 %    % Eosinophils 0.9 %    % Basophils 0.1 %    % Immature Granulocytes 0.2 %    Nucleated RBCs 0 0 /100    Absolute Neutrophil 7.0 1.6 - 8.3 10e9/L    Absolute Lymphocytes 1.7 0.8 - 5.3 10e9/L    Absolute Monocytes 0.4 0.0 - 1.3 10e9/L    Absolute Eosinophils 0.1 0.0 - 0.7 10e9/L    Absolute Basophils 0.0 0.0 - 0.2 10e9/L    Abs Immature Granulocytes 0.0 0 - 0.4 10e9/L    Absolute Nucleated RBC 0.0    Comprehensive metabolic panel     Status: Abnormal   Result Value Ref Range    Sodium 138 133 - 144 mmol/L    Potassium  4.0 3.4 - 5.3 mmol/L    Chloride 106 94 - 109 mmol/L    Carbon Dioxide 24 20 - 32 mmol/L    Anion Gap 8 3 - 14 mmol/L    Glucose 109 (H) 70 - 99 mg/dL    Urea Nitrogen 14 7 - 30 mg/dL    Creatinine 0.91 0.66 - 1.25 mg/dL    GFR Estimate >90 >60 mL/min/[1.73_m2]    GFR Estimate If Black >90 >60 mL/min/[1.73_m2]    Calcium 9.1 8.5 - 10.1 mg/dL    Bilirubin Total 0.5 0.2 - 1.3 mg/dL    Albumin 4.4 3.4 - 5.0 g/dL    Protein Total 8.5 6.8 - 8.8 g/dL    Alkaline Phosphatase 82 40 - 150 U/L    ALT 72 (H) 0 - 70 U/L    AST 37 0 - 45 U/L   Lipase     Status: None   Result Value Ref Range    Lipase 120 73 - 393 U/L   Lactic acid whole blood     Status: Abnormal   Result Value Ref Range    Lactic Acid 2.1 (H) 0.7 - 2.0 mmol/L   Troponin I     Status: None   Result Value Ref Range    Troponin I ES <0.015 0.000 - 0.045 ug/L   EKG 12 lead     Status: None   Result Value Ref Range    Interpretation ECG Click View Image link to view waveform and result             Assessments & Plan (with Medical Decision Making)   Presents with epigastric severe pain since yesterday.  Hx of appy rupture and prior surgery.  He appears in significant distress.   Diff dx:  Pancreatitis, MI, gallbladder pathology, small bowel obstruction, ulcer, gastritis.   Labs sent.   Dilaudid, IV fluids, zofran, npo ordered.  Abdominal ultrasound ordered.   Labs return and reviewed.  No acute concerns.  Troponin negative, lipase normal, only ALT 72.  Lactic acid 2.1.  Ultrasound shows gallstones with stone in neck of gallbladder.  Normal CBD and no evidence of cholecystitis.  Still has ruq/epigastric pain.  Discussed with surgery staff who recommends admission to their service for hydration/pain control.   Inpatient bed ordered after discussion with patient via .  He agrees with admission.     I have reviewed the nursing notes.    I have reviewed the findings, diagnosis, plan and need for follow up with the patient.    New Prescriptions    No  medications on file       Final diagnoses:   Calculus of gallbladder and bile duct with obstruction without cholecystitis     IHollis, am serving as a trained medical scribe to document services personally performed by Jordyn Allen MD, based on the provider's statements to me.   IJordyn MD, was physically present and have reviewed and verified the accuracy of this note documented by Hollis Almaraz.     9/12/2020   [facility], [place], EMERGENCY DEPARTMENT        Jordyn Allen MD  09/12/20 5003

## 2020-09-12 NOTE — LETTER
UNIT 7C [facility]  [address] [address]  [phone]          September 15, 2020    RE:  Leonidas Hawk                                                                                                                                                       [address] [address]            To whom it may concern:    Leonidas Hawk underwent surgery on 9/14. He  may return to work with the following: The employee is ABLE to return to work 9/21.    When the patient returns to work, the following restrictions apply until 10/15:   Light duty-unable to lift more than 15 pounds for 4 weeks.       Sincerely,      Cristhian Norman MD  General Surgery

## 2020-09-12 NOTE — ED NOTES
[facility]   ED Nurse to Floor Handoff     Leonidas Hawk is a 40 year old male who speaks Lithuanian and lives alone,  in a home  They arrived in the ED by car from home    ED Chief Complaint: Abdominal Pain (2-3 days worsening sharp pain radiating towards back) and Rectal Bleeding (3 days ago)    ED Dx;   Final diagnoses:   Calculus of gallbladder and bile duct with obstruction without cholecystitis         Needed?: Yes Slovak    Allergies: No Known Allergies.  Past Medical Hx: History reviewed. No pertinent past medical history.   Baseline Mental status: WDL  Current Mental Status changes: at basesline    Infection present or suspected this encounter: no  Sepsis suspected: No  Isolation type: No active isolations     Activity level - Baseline/Home:  Independent  Activity Level - Current:   Independent    Bariatric equipment needed?: No    In the ED these meds were given:   Medications   sodium chloride 0.9% infusion ( Intravenous New Bag 9/12/20 1435)   HYDROmorphone (PF) (DILAUDID) injection 0.5 mg (0.5 mg Intravenous Given 9/12/20 1339)   ondansetron (ZOFRAN) injection 4 mg (4 mg Intravenous Given 9/12/20 1339)   0.9% sodium chloride BOLUS (0 mLs Intravenous Stopped 9/12/20 1435)   HYDROmorphone (PF) (DILAUDID) injection 0.5 mg (0.5 mg Intravenous Given 9/12/20 1436)       Drips running?  No    Home pump  No    Current LDAs  Peripheral IV 09/12/20 Right (Active)   Number of days: 0       Labs results:   Labs Ordered and Resulted from Time of ED Arrival Up to the Time of Departure from the ED   COMPREHENSIVE METABOLIC PANEL - Abnormal; Notable for the following components:       Result Value    Glucose 109 (*)     ALT 72 (*)     All other components within normal limits   LACTIC ACID WHOLE BLOOD - Abnormal; Notable for the following components:    Lactic Acid 2.1 (*)     All other components within normal limits   CBC WITH PLATELETS DIFFERENTIAL   LIPASE   TROPONIN  I   UA MACROSCOPIC WITH REFLEX TO MICRO AND CULTURE   MAY SALINE LOCK IV       Imaging Studies:   Recent Results (from the past 24 hour(s))   Abdomen US, complete    Narrative    US ABDOMEN COMPLETE 9/12/2020 3:16 PM    CLINICAL HISTORY: several abdominal pain epigastric ruq region.  ?  gallbladder, pancreas, aorta/kidney pathology?, see above.    TECHNIQUE: Complete abdominal ultrasound.    COMPARISON: None.    FINDINGS:    GALLBLADDER: Shadowing calculus in the gallbladder neck. No  gallbladder wall thickening or pericholecystic fluid. Sonographic  Kelly sign was negative.    BILE DUCTS: No biliary dilatation. The common duct measures 5 mm.    LIVER: Increased echogenicity from diffuse fatty infiltration.  Hepatomegaly. No focal mass.    RIGHT KIDNEY: Normal size. Normal echogenicity with no hydronephrosis  or mass.     LEFT KIDNEY: Normal size. Normal echogenicity with no hydronephrosis  or mass.     SPLEEN: Normal.    PANCREAS: The pancreatic head is within normal limits. The body and  tail are obscured by overlying bowel gas.    AORTA: Normal in caliber.     IVC: Normal where visualized.    No ascites.      Impression    IMPRESSION:  1.  Cholelithiasis with a stone in the gallbladder neck. No  sonographic evidence for acute cholecystitis.  2.  Hepatic steatosis.    TANIA SALAMANCA MD       Recent vital signs:   BP (!) 148/105   Pulse 62   Temp 97.9  F (36.6  C) (Oral)   Resp 16   SpO2 98%     Stefano Coma Scale Score: 15 (09/12/20 1237)       Cardiac Rhythm: Normal Sinus  Pt needs tele? No  Skin/wound Issues: None    Code Status: Full Code    Pain control: good    Nausea control: good    Abnormal labs/tests/findings requiring intervention: US-- gall bladder    Family present during ED course? No   Family Comments/Social Situation comments:     Tasks needing completion: None    Narendra Narvaez, RN  [phone] [facility]

## 2020-09-13 LAB
ALBUMIN SERPL-MCNC: 3.8 G/DL (ref 3.4–5)
ALP SERPL-CCNC: 72 U/L (ref 40–150)
ALT SERPL W P-5'-P-CCNC: 65 U/L (ref 0–70)
ANION GAP SERPL CALCULATED.3IONS-SCNC: 5 MMOL/L (ref 3–14)
AST SERPL W P-5'-P-CCNC: 36 U/L (ref 0–45)
BILIRUB SERPL-MCNC: 0.8 MG/DL (ref 0.2–1.3)
BUN SERPL-MCNC: 12 MG/DL (ref 7–30)
CALCIUM SERPL-MCNC: 8.8 MG/DL (ref 8.5–10.1)
CHLORIDE SERPL-SCNC: 108 MMOL/L (ref 94–109)
CO2 SERPL-SCNC: 24 MMOL/L (ref 20–32)
CREAT SERPL-MCNC: 1.01 MG/DL (ref 0.66–1.25)
ERYTHROCYTE [DISTWIDTH] IN BLOOD BY AUTOMATED COUNT: 12.2 % (ref 10–15)
GFR SERPL CREATININE-BSD FRML MDRD: >90 ML/MIN/{1.73_M2}
GLUCOSE SERPL-MCNC: 94 MG/DL (ref 70–99)
HCT VFR BLD AUTO: 44.6 % (ref 40–53)
HGB BLD-MCNC: 15.3 G/DL (ref 13.3–17.7)
LABORATORY COMMENT REPORT: NORMAL
MAGNESIUM SERPL-MCNC: 2 MG/DL (ref 1.6–2.3)
MCH RBC QN AUTO: 28.7 PG (ref 26.5–33)
MCHC RBC AUTO-ENTMCNC: 34.3 G/DL (ref 31.5–36.5)
MCV RBC AUTO: 84 FL (ref 78–100)
PHOSPHATE SERPL-MCNC: 2.3 MG/DL (ref 2.5–4.5)
PLATELET # BLD AUTO: 277 10E9/L (ref 150–450)
POTASSIUM SERPL-SCNC: 4 MMOL/L (ref 3.4–5.3)
PROT SERPL-MCNC: 7.6 G/DL (ref 6.8–8.8)
RBC # BLD AUTO: 5.33 10E12/L (ref 4.4–5.9)
SARS-COV-2 RNA SPEC QL NAA+PROBE: NEGATIVE
SARS-COV-2 RNA SPEC QL NAA+PROBE: NORMAL
SODIUM SERPL-SCNC: 138 MMOL/L (ref 133–144)
SPECIMEN SOURCE: NORMAL
SPECIMEN SOURCE: NORMAL
WBC # BLD AUTO: 5.9 10E9/L (ref 4–11)

## 2020-09-13 PROCEDURE — 84100 ASSAY OF PHOSPHORUS: CPT | Performed by: STUDENT IN AN ORGANIZED HEALTH CARE EDUCATION/TRAINING PROGRAM

## 2020-09-13 PROCEDURE — 12000001 ZZH R&B MED SURG/OB UMMC

## 2020-09-13 PROCEDURE — 36415 COLL VENOUS BLD VENIPUNCTURE: CPT | Performed by: STUDENT IN AN ORGANIZED HEALTH CARE EDUCATION/TRAINING PROGRAM

## 2020-09-13 PROCEDURE — 25000132 ZZH RX MED GY IP 250 OP 250 PS 637: Performed by: STUDENT IN AN ORGANIZED HEALTH CARE EDUCATION/TRAINING PROGRAM

## 2020-09-13 PROCEDURE — 80053 COMPREHEN METABOLIC PANEL: CPT | Performed by: STUDENT IN AN ORGANIZED HEALTH CARE EDUCATION/TRAINING PROGRAM

## 2020-09-13 PROCEDURE — 85027 COMPLETE CBC AUTOMATED: CPT | Performed by: STUDENT IN AN ORGANIZED HEALTH CARE EDUCATION/TRAINING PROGRAM

## 2020-09-13 PROCEDURE — U0003 INFECTIOUS AGENT DETECTION BY NUCLEIC ACID (DNA OR RNA); SEVERE ACUTE RESPIRATORY SYNDROME CORONAVIRUS 2 (SARS-COV-2) (CORONAVIRUS DISEASE [COVID-19]), AMPLIFIED PROBE TECHNIQUE, MAKING USE OF HIGH THROUGHPUT TECHNOLOGIES AS DESCRIBED BY CMS-2020-01-R: HCPCS | Performed by: STUDENT IN AN ORGANIZED HEALTH CARE EDUCATION/TRAINING PROGRAM

## 2020-09-13 PROCEDURE — 25800030 ZZH RX IP 258 OP 636: Performed by: STUDENT IN AN ORGANIZED HEALTH CARE EDUCATION/TRAINING PROGRAM

## 2020-09-13 PROCEDURE — 83735 ASSAY OF MAGNESIUM: CPT | Performed by: STUDENT IN AN ORGANIZED HEALTH CARE EDUCATION/TRAINING PROGRAM

## 2020-09-13 RX ADMIN — ACETAMINOPHEN 975 MG: 325 TABLET, FILM COATED ORAL at 13:46

## 2020-09-13 RX ADMIN — SODIUM CHLORIDE, POTASSIUM CHLORIDE, SODIUM LACTATE AND CALCIUM CHLORIDE 1000 ML: 600; 310; 30; 20 INJECTION, SOLUTION INTRAVENOUS at 16:03

## 2020-09-13 RX ADMIN — ACETAMINOPHEN 975 MG: 325 TABLET, FILM COATED ORAL at 08:05

## 2020-09-13 ASSESSMENT — ACTIVITIES OF DAILY LIVING (ADL)
SWALLOWING: 0-->SWALLOWS FOODS/LIQUIDS WITHOUT DIFFICULTY
DRESS: 0-->INDEPENDENT
RETIRED_COMMUNICATION: 0-->UNDERSTANDS/COMMUNICATES WITHOUT DIFFICULTY
TOILETING: 0-->INDEPENDENT
BATHING: 0-->INDEPENDENT
AMBULATION: 0-->INDEPENDENT
COGNITION: 0 - NO COGNITION ISSUES REPORTED
ADLS_ACUITY_SCORE: 10
ADLS_ACUITY_SCORE: 12
ADLS_ACUITY_SCORE: 10
FALL_HISTORY_WITHIN_LAST_SIX_MONTHS: NO
RETIRED_EATING: 0-->INDEPENDENT
TRANSFERRING: 0-->INDEPENDENT
ADLS_ACUITY_SCORE: 10

## 2020-09-13 NOTE — PROGRESS NOTES
General Surgery Progress Note    Subjective: NAEON. Denies nausea/vomiting. Complains of mild RLQ abdominal pain.     Objective:   /62 (BP Location: Right arm)   Pulse 62   Temp 97.6  F (36.4  C) (Oral)   Resp 16   SpO2 95%     PE:  Gen: NAD, comfortable in bed  CV: RRR  Resp: non-labored breathing on RA  Abd: Soft, TTP RLQ, ND, no rebound/guarding  Ext: warm and well perfused      No intake or output data in the 24 hours ending 09/13/20 0553    Recent labs reviewed.    Recent imaging reviewed.    A/P: Leonidas Hawk is a 40 year old male otherwise healthy male with story and clinical picture consistent with symptomatic cholelithiasis. No signs of acute cholecystitis at this time. Pt with normal  WBC count, afebrile, no gallbladder wall thickening observed on ultrasound.    -NPO, IVF  -Pain control  -AM labs  -Tentative OR tomorrow 9/14 lap michaela      Seen and discussed with chief resident, Dr. Norman, who will discuss with staff.    Braulio Carbone MD  Surgery PGY-1

## 2020-09-13 NOTE — PROGRESS NOTES
Transferred at 2030 from [place] ED.  2 RN full   skin assessment completed by Alexys Thorne, GEORGI and Isa Luke RN.  Skin assessment finding: skin intact, no problems   Interventions/actions: skin interventions No interventions at this time.     Will continue to monitor.

## 2020-09-13 NOTE — H&P
SURGERY ADMISSION HISTORY & PHYSICAL  Leonidas Hawk MRN# [id number]   YOB: 1980 Age: 40 year old     Date of Admission: 09/12/20    CC: abdominal pain    HPI: Leonidas aHwk is a 40 year old year old otherwise healthy male who presents from the [facility] emergency department with abdominal pain, found on abdominal ultrasound to have cholelithiasis with a stone in the gallbladder neck, without sonographic evidence of acute cholecystitis. This is consistent with the patient's clinical history.    Through , he reports that he has had RUQ abdominal pain on and off for 1 year. In the past month, this has been present every time he eats. Yesterday at lunch the pain became severe and did not resolve, prompting him to come to the ED. He endorses nausea when having the RUQ pain, has vomited occasionally but not consistently. Also endorses radiation to his back. He denies fevers or chills, no chest pain or shortness of breath. Denies difficulty urinating or changes in defecation. Does not that when he has hard stools, he has bright red blood both on toilet paper and in toilet bowl.  He has not had diarrhea and states that he has not had black or tarry stools.    Of note, the patient has a history of perforated appendicitis ~20 years ago for which he had an exploratory laparotomy and washout/appendectomy with drain placement.       Patient was seen by his PCP earlier this week. At that time, concern was for an ulcer. He was started on prilosec. Also noted to have elevated triglycerides of 367 on lipid panel.        REVIEW OF SYSTEMS: The remainder of the complete ROS was negative unless noted in the HPI. Denies visual changes, headache, sore throat, rhinorrhea, chest pain, sob,  fevers, night sweats, weight loss.     PAST MEDICAL HISTORY: History reviewed. No pertinent past medical history.    PAST SURGICAL HISTORY:   Past Surgical History:   Procedure Laterality Date     APPENDECTOMY  2000      DENTAL SURGERY         ALLERGIES:  No Known Allergies    HOME MEDICATIONS: No current facility-administered medications on file prior to encounter.   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 Non-tender in LLQ, LUQ, RLQ. No CVA tenderness  Extremities: WWP without edema  Neuro: No focal deficits noted, patient moves all extremities spontaneously    LABS:  Recent Labs   Lab 20  1249   WBC 9.1   RBC 5.47   HGB 16.5   HCT 45.7   MCV 84   MCH 30.2   MCHC 36.1   RDW 11.9          Recent Labs   Lab 20  1249      POTASSIUM 4.0   CHLORIDE 106   CO2 24   BUN 14   CR 0.91   *   GISELLA 9.1       Recent Labs   Lab 20  1249   AST 37   ALT 72*   ALKPHOS 82   BILITOTAL 0.5   ALBUMIN 4.4       IMAGING:  TECHNIQUE: Complete abdominal ultrasound.  GALLBLADDER: Shadowing calculus in the gallbladder neck. No  gallbladder wall thickening or pericholecystic fluid. Sonographic  Kelly sign was negative.  BILE DUCTS: No biliary  dilatation. The common duct measures 5 mm.  LIVER: Increased echogenicity from diffuse fatty infiltration.  Hepatomegaly. No focal mass.  RIGHT KIDNEY: Normal size. Normal echogenicity with no hydronephrosis  or mass.   LEFT KIDNEY: Normal size. Normal echogenicity with no hydronephrosis  or mass.   SPLEEN: Normal.  PANCREAS: The pancreatic head is within normal limits. The body and tail are obscured by overlying bowel gas.                                               IMPRESSION:  1.  Cholelithiasis with a stone in the gallbladder neck. No  sonographic evidence for acute cholecystitis.  2.  Hepatic steatosis.       A/P: Leonidas Hawk is a 40 year old otherwise healthy male with story and clinical picture consistent with symptomatic cholelithiasis. No signs of acute cholecystitis at this time. Pt with normal  WBC count, afebrile, no gallbladder wall thickening observed on ultrasound.     - Admit to General Surgery under Dr. Saavedra  - NPO except meds  - Pain control with IV dilaudid, oxycodone, merlene tylenol   - AM CBC, BMP, Mg, phos  - MIVF  - STAT asx COVID swab  - No indication for antibiotics at this time but will reassess clinically as needed if pt develops signs of acute cholecystitis    Discussed with Dr. Quentin Gillespie MD   General Surgery PGY1  [phone]

## 2020-09-13 NOTE — PLAN OF CARE
NEURO: alert and oriented. Iraqi speaking, understands basic english.   RESPIRATORY: LS clear. On RA.  CARDIO: VSS.   GI/: BS active. Denied nausea. Clear liquid diet. Voiding without difficulty.   SKIN: Intact. Old surgery scar on abdomen.   ACTIVITY: Up ad carmen.   PAIN: Denied.   LINES: PIV saline locked.   PLAN:  NPO at MN. Possible surgery tomorrow AM.

## 2020-09-13 NOTE — PLAN OF CARE
Assumed care of Patient from 2230-0730. AVSS on RA. Luxembourgish speaking, interpretor Ipad used. Abdominal pain managed with IV dilaudid. Patient denies nausea. NPO, for possible procedure if surgery deems necessary after morning consultation. R PIV currently infusing IVMF. Patient reports + flatus, No BM overnight. Patient voiding in adequate amounts, using bedside urinal. Patient up with SBA. Monitor vitals/pain and continue with POC.

## 2020-09-14 ENCOUNTER — ANESTHESIA EVENT (OUTPATIENT)
Dept: SURGERY | Facility: CLINIC | Age: 40
End: 2020-09-14
Payer: COMMERCIAL

## 2020-09-14 ENCOUNTER — ANESTHESIA (OUTPATIENT)
Dept: SURGERY | Facility: CLINIC | Age: 40
End: 2020-09-14
Payer: COMMERCIAL

## 2020-09-14 LAB — GLUCOSE BLDC GLUCOMTR-MCNC: 73 MG/DL (ref 70–99)

## 2020-09-14 PROCEDURE — 25000128 H RX IP 250 OP 636: Performed by: STUDENT IN AN ORGANIZED HEALTH CARE EDUCATION/TRAINING PROGRAM

## 2020-09-14 PROCEDURE — 0DNW4ZZ RELEASE PERITONEUM, PERCUTANEOUS ENDOSCOPIC APPROACH: ICD-10-PCS | Performed by: SURGERY

## 2020-09-14 PROCEDURE — 25000132 ZZH RX MED GY IP 250 OP 250 PS 637: Performed by: STUDENT IN AN ORGANIZED HEALTH CARE EDUCATION/TRAINING PROGRAM

## 2020-09-14 PROCEDURE — 25800030 ZZH RX IP 258 OP 636: Performed by: STUDENT IN AN ORGANIZED HEALTH CARE EDUCATION/TRAINING PROGRAM

## 2020-09-14 PROCEDURE — 36000057 ZZH SURGERY LEVEL 3 1ST 30 MIN - UMMC: Performed by: SURGERY

## 2020-09-14 PROCEDURE — 37000008 ZZH ANESTHESIA TECHNICAL FEE, 1ST 30 MIN: Performed by: SURGERY

## 2020-09-14 PROCEDURE — 88304 TISSUE EXAM BY PATHOLOGIST: CPT | Performed by: SURGERY

## 2020-09-14 PROCEDURE — 25000132 ZZH RX MED GY IP 250 OP 250 PS 637: Performed by: SURGERY

## 2020-09-14 PROCEDURE — 25000125 ZZHC RX 250: Performed by: STUDENT IN AN ORGANIZED HEALTH CARE EDUCATION/TRAINING PROGRAM

## 2020-09-14 PROCEDURE — 25800030 ZZH RX IP 258 OP 636: Performed by: NURSE ANESTHETIST, CERTIFIED REGISTERED

## 2020-09-14 PROCEDURE — 27210794 ZZH OR GENERAL SUPPLY STERILE: Performed by: SURGERY

## 2020-09-14 PROCEDURE — 0FT44ZZ RESECTION OF GALLBLADDER, PERCUTANEOUS ENDOSCOPIC APPROACH: ICD-10-PCS | Performed by: SURGERY

## 2020-09-14 PROCEDURE — 71000016 ZZH RECOVERY PHASE 1 LEVEL 3 FIRST HR: Performed by: SURGERY

## 2020-09-14 PROCEDURE — 37000009 ZZH ANESTHESIA TECHNICAL FEE, EACH ADDTL 15 MIN: Performed by: SURGERY

## 2020-09-14 PROCEDURE — 25000566 ZZH SEVOFLURANE, EA 15 MIN: Performed by: SURGERY

## 2020-09-14 PROCEDURE — 25000125 ZZHC RX 250: Performed by: SURGERY

## 2020-09-14 PROCEDURE — 25000125 ZZHC RX 250: Performed by: NURSE ANESTHETIST, CERTIFIED REGISTERED

## 2020-09-14 PROCEDURE — 00000146 ZZHCL STATISTIC GLUCOSE BY METER IP

## 2020-09-14 PROCEDURE — 25000128 H RX IP 250 OP 636: Performed by: SURGERY

## 2020-09-14 PROCEDURE — 25800030 ZZH RX IP 258 OP 636: Performed by: SURGERY

## 2020-09-14 PROCEDURE — 25000128 H RX IP 250 OP 636: Performed by: NURSE ANESTHETIST, CERTIFIED REGISTERED

## 2020-09-14 PROCEDURE — 12000001 ZZH R&B MED SURG/OB UMMC

## 2020-09-14 PROCEDURE — 36000059 ZZH SURGERY LEVEL 3 EA 15 ADDTL MIN UMMC: Performed by: SURGERY

## 2020-09-14 PROCEDURE — 71000017 ZZH RECOVERY PHASE 1 LEVEL 3 EA ADDTL HR: Performed by: SURGERY

## 2020-09-14 PROCEDURE — 40000170 ZZH STATISTIC PRE-PROCEDURE ASSESSMENT II: Performed by: SURGERY

## 2020-09-14 RX ORDER — HYDROMORPHONE HYDROCHLORIDE 1 MG/ML
.3-.5 INJECTION, SOLUTION INTRAMUSCULAR; INTRAVENOUS; SUBCUTANEOUS EVERY 5 MIN PRN
Status: DISCONTINUED | OUTPATIENT
Start: 2020-09-14 | End: 2020-09-14 | Stop reason: HOSPADM

## 2020-09-14 RX ORDER — ONDANSETRON 2 MG/ML
4 INJECTION INTRAMUSCULAR; INTRAVENOUS EVERY 30 MIN PRN
Status: DISCONTINUED | OUTPATIENT
Start: 2020-09-14 | End: 2020-09-14 | Stop reason: HOSPADM

## 2020-09-14 RX ORDER — LIDOCAINE HYDROCHLORIDE AND EPINEPHRINE 10; 10 MG/ML; UG/ML
INJECTION, SOLUTION INFILTRATION; PERINEURAL PRN
Status: DISCONTINUED | OUTPATIENT
Start: 2020-09-14 | End: 2020-09-14 | Stop reason: HOSPADM

## 2020-09-14 RX ORDER — CEFAZOLIN SODIUM 1 G/3ML
1 INJECTION, POWDER, FOR SOLUTION INTRAMUSCULAR; INTRAVENOUS SEE ADMIN INSTRUCTIONS
Status: DISCONTINUED | OUTPATIENT
Start: 2020-09-14 | End: 2020-09-15 | Stop reason: HOSPADM

## 2020-09-14 RX ORDER — LABETALOL HYDROCHLORIDE 5 MG/ML
10 INJECTION, SOLUTION INTRAVENOUS
Status: DISCONTINUED | OUTPATIENT
Start: 2020-09-14 | End: 2020-09-14 | Stop reason: HOSPADM

## 2020-09-14 RX ORDER — SODIUM CHLORIDE, SODIUM LACTATE, POTASSIUM CHLORIDE, CALCIUM CHLORIDE 600; 310; 30; 20 MG/100ML; MG/100ML; MG/100ML; MG/100ML
INJECTION, SOLUTION INTRAVENOUS CONTINUOUS PRN
Status: DISCONTINUED | OUTPATIENT
Start: 2020-09-14 | End: 2020-09-14

## 2020-09-14 RX ORDER — SODIUM CHLORIDE, SODIUM LACTATE, POTASSIUM CHLORIDE, CALCIUM CHLORIDE 600; 310; 30; 20 MG/100ML; MG/100ML; MG/100ML; MG/100ML
INJECTION, SOLUTION INTRAVENOUS CONTINUOUS
Status: DISCONTINUED | OUTPATIENT
Start: 2020-09-14 | End: 2020-09-14 | Stop reason: HOSPADM

## 2020-09-14 RX ORDER — NALOXONE HYDROCHLORIDE 0.4 MG/ML
.1-.4 INJECTION, SOLUTION INTRAMUSCULAR; INTRAVENOUS; SUBCUTANEOUS
Status: DISCONTINUED | OUTPATIENT
Start: 2020-09-14 | End: 2020-09-15 | Stop reason: HOSPADM

## 2020-09-14 RX ORDER — FENTANYL CITRATE 50 UG/ML
INJECTION, SOLUTION INTRAMUSCULAR; INTRAVENOUS PRN
Status: DISCONTINUED | OUTPATIENT
Start: 2020-09-14 | End: 2020-09-14

## 2020-09-14 RX ORDER — FENTANYL CITRATE 50 UG/ML
25-50 INJECTION, SOLUTION INTRAMUSCULAR; INTRAVENOUS
Status: DISCONTINUED | OUTPATIENT
Start: 2020-09-14 | End: 2020-09-14 | Stop reason: HOSPADM

## 2020-09-14 RX ORDER — LIDOCAINE HYDROCHLORIDE 20 MG/ML
INJECTION, SOLUTION INFILTRATION; PERINEURAL PRN
Status: DISCONTINUED | OUTPATIENT
Start: 2020-09-14 | End: 2020-09-14

## 2020-09-14 RX ORDER — ONDANSETRON 4 MG/1
4 TABLET, ORALLY DISINTEGRATING ORAL EVERY 30 MIN PRN
Status: DISCONTINUED | OUTPATIENT
Start: 2020-09-14 | End: 2020-09-14 | Stop reason: HOSPADM

## 2020-09-14 RX ORDER — CEFAZOLIN SODIUM 2 G/100ML
2 INJECTION, SOLUTION INTRAVENOUS
Status: DISCONTINUED | OUTPATIENT
Start: 2020-09-14 | End: 2020-09-15 | Stop reason: HOSPADM

## 2020-09-14 RX ORDER — DEXAMETHASONE SODIUM PHOSPHATE 4 MG/ML
INJECTION, SOLUTION INTRA-ARTICULAR; INTRALESIONAL; INTRAMUSCULAR; INTRAVENOUS; SOFT TISSUE PRN
Status: DISCONTINUED | OUTPATIENT
Start: 2020-09-14 | End: 2020-09-14

## 2020-09-14 RX ORDER — OXYCODONE HYDROCHLORIDE 5 MG/1
5 TABLET ORAL EVERY 6 HOURS PRN
Qty: 10 TABLET | Refills: 0 | Status: SHIPPED | OUTPATIENT
Start: 2020-09-14 | End: 2020-09-17

## 2020-09-14 RX ORDER — PROPOFOL 10 MG/ML
INJECTION, EMULSION INTRAVENOUS PRN
Status: DISCONTINUED | OUTPATIENT
Start: 2020-09-14 | End: 2020-09-14

## 2020-09-14 RX ORDER — ONDANSETRON 2 MG/ML
INJECTION INTRAMUSCULAR; INTRAVENOUS PRN
Status: DISCONTINUED | OUTPATIENT
Start: 2020-09-14 | End: 2020-09-14

## 2020-09-14 RX ADMIN — SODIUM CHLORIDE, POTASSIUM CHLORIDE, SODIUM LACTATE AND CALCIUM CHLORIDE: 600; 310; 30; 20 INJECTION, SOLUTION INTRAVENOUS at 13:42

## 2020-09-14 RX ADMIN — SODIUM CHLORIDE, POTASSIUM CHLORIDE, SODIUM LACTATE AND CALCIUM CHLORIDE 1000 ML: 600; 310; 30; 20 INJECTION, SOLUTION INTRAVENOUS at 05:59

## 2020-09-14 RX ADMIN — HYDROMORPHONE HYDROCHLORIDE 0.5 MG: 1 INJECTION, SOLUTION INTRAMUSCULAR; INTRAVENOUS; SUBCUTANEOUS at 16:36

## 2020-09-14 RX ADMIN — HYDROMORPHONE HYDROCHLORIDE 0.5 MG: 1 INJECTION, SOLUTION INTRAMUSCULAR; INTRAVENOUS; SUBCUTANEOUS at 15:29

## 2020-09-14 RX ADMIN — FENTANYL CITRATE 50 MCG: 50 INJECTION, SOLUTION INTRAMUSCULAR; INTRAVENOUS at 16:28

## 2020-09-14 RX ADMIN — SODIUM CHLORIDE, POTASSIUM CHLORIDE, SODIUM LACTATE AND CALCIUM CHLORIDE 1000 ML: 600; 310; 30; 20 INJECTION, SOLUTION INTRAVENOUS at 16:00

## 2020-09-14 RX ADMIN — HYDROMORPHONE HYDROCHLORIDE 0.5 MG: 1 INJECTION, SOLUTION INTRAMUSCULAR; INTRAVENOUS; SUBCUTANEOUS at 16:28

## 2020-09-14 RX ADMIN — ROCURONIUM BROMIDE 100 MG: 10 INJECTION INTRAVENOUS at 13:52

## 2020-09-14 RX ADMIN — ACETAMINOPHEN 975 MG: 325 TABLET, FILM COATED ORAL at 19:02

## 2020-09-14 RX ADMIN — DEXAMETHASONE SODIUM PHOSPHATE 6 MG: 4 INJECTION, SOLUTION INTRA-ARTICULAR; INTRALESIONAL; INTRAMUSCULAR; INTRAVENOUS; SOFT TISSUE at 15:23

## 2020-09-14 RX ADMIN — SUGAMMADEX 200 MG: 100 INJECTION, SOLUTION INTRAVENOUS at 15:26

## 2020-09-14 RX ADMIN — HYDROMORPHONE HYDROCHLORIDE 0.5 MG: 1 INJECTION, SOLUTION INTRAMUSCULAR; INTRAVENOUS; SUBCUTANEOUS at 18:42

## 2020-09-14 RX ADMIN — ONDANSETRON 4 MG: 2 INJECTION INTRAMUSCULAR; INTRAVENOUS at 15:23

## 2020-09-14 RX ADMIN — PROPOFOL 200 MG: 10 INJECTION, EMULSION INTRAVENOUS at 13:52

## 2020-09-14 RX ADMIN — FENTANYL CITRATE 50 MCG: 50 INJECTION, SOLUTION INTRAMUSCULAR; INTRAVENOUS at 15:27

## 2020-09-14 RX ADMIN — ACETAMINOPHEN 975 MG: 325 TABLET, FILM COATED ORAL at 11:56

## 2020-09-14 RX ADMIN — FENTANYL CITRATE 25 MCG: 50 INJECTION, SOLUTION INTRAMUSCULAR; INTRAVENOUS at 16:20

## 2020-09-14 RX ADMIN — ROCURONIUM BROMIDE 20 MG: 10 INJECTION INTRAVENOUS at 14:58

## 2020-09-14 RX ADMIN — LIDOCAINE HYDROCHLORIDE 100 MG: 20 INJECTION, SOLUTION INFILTRATION; PERINEURAL at 13:52

## 2020-09-14 RX ADMIN — FENTANYL CITRATE 100 MCG: 50 INJECTION, SOLUTION INTRAMUSCULAR; INTRAVENOUS at 14:14

## 2020-09-14 RX ADMIN — CEFAZOLIN 2 G: 1 INJECTION, POWDER, FOR SOLUTION INTRAMUSCULAR; INTRAVENOUS at 13:57

## 2020-09-14 RX ADMIN — FENTANYL CITRATE 25 MCG: 50 INJECTION, SOLUTION INTRAMUSCULAR; INTRAVENOUS at 16:12

## 2020-09-14 ASSESSMENT — ACTIVITIES OF DAILY LIVING (ADL)
ADLS_ACUITY_SCORE: 10
ADLS_ACUITY_SCORE: 11
ADLS_ACUITY_SCORE: 10
ADLS_ACUITY_SCORE: 10

## 2020-09-14 ASSESSMENT — LIFESTYLE VARIABLES: TOBACCO_USE: 1

## 2020-09-14 NOTE — OR NURSING
Dr. Russell Webb, with anesthesia, notified patient has met criteria and is ready for transfer back to . Dr. Webb is busy in the operating room and will enter post op sign out as soon as time allows.

## 2020-09-14 NOTE — OP NOTE
[facility], [place]    Operative Note    Pre-operative diagnosis:  Symptomatic cholelithiasis   Post-operative diagnosis  chronic calculus cholecystitis   Procedure: Procedure(s):  LAPAROSCOPIC  CHOLECYSTECTOMY  Laparoscopic lysis adhesions   Surgeon: Surgeon(s) and Role:     * Cortez Goldman MD - Primary     * Cristhian Norman MD - Resident - Assisting     * Adela Barreto MD - Resident - Assisting   Anesthesia: General    Estimated blood loss:  10 mL   Drains: None   Specimens: ID Type Source Tests Collected by Time Destination   A :  Tissue Gallbladder and Contents SURGICAL PATHOLOGY EXAM Cortez Goldman MD 9/14/2020  3:10 PM       Findings: Impacted gallstone within the gallbladder neck.  Evidence of chronic cholecystitis.  Normal anatomy.. Significant intraabdominal adhesions      Complications: None.   Implants: None.       OPERATIVE INDICATIONS:  Leonidas Hawk is a 40 year old male with a history of RUQ abdominal pain associated with cholelithiasis on right upper quadrant ultrasound.   After understanding the risks and benefits of proceeding with surgery, the patient has an indication for laparoscopic cholecystectomy and consented to undergo surgery.    OPERATIVE DETAILS:      The patient was brought to the operating room and prepared in a routine fashion.  A timeout was performed prior to surgery and documented by the nursing team.     Under the benefits of general anesthesia, a left upper quadrant Veress needle was inserted and pneumoperitoneum was established using carbon dioxide gas to a maximum pressure of 15 mmHg.    We then removed the Veress needle and introduced a 5 mm port through Visiport technique.  We entered the abdomen safely.  We noted numerous adhesions to the abdominal wall from his prior exploratory laparotomy.  We placed an additional 5 mm port in the left abdomen to assist with lysis of adhesions.  The adhesions were taken down  with a combination of blunt dissection, sharp dissection with scissors, and electrocautery.  After we freed enough space to place the rest of the ports, we placed an 11 mm port over supraumbilical area.  We then placed 2 5 mm ports in the right upper quadrant.  The liver had some lateral adhesions to the abdominal wall which prevented the liver from retracting cephalad.  These adhesions were taken down with electrocautery.       The patient was moved into a steep reverse Trendelenberg position. Adhesions to the gallbladder were taken down with a combination of blunt and sharp dissection. The gallbladder was grasped at its fundus and retracted cephalad.  It was also grasped at its infundibulum and retracted laterally.    Retraction was somewhat challenging due to the impacted stone in the infundibulum.     A complete dissection starting on the gallbladder, identifying the cystic artery on the surface of the gallbladder, was performed.  The cystic artery in this manner was  away from the gallbladder and the infundibulum of the gallbladder and the junction of gallbladder and cystic duct was clearly and completely identified with blunt dissection.  All of this dissection was performed on the gallbladder wall and clearly above the triangle of Calot.     Next, a dissection of gallbladder retrograde from fundus and the peritoneum was divided along the lateral aspect of the gallbladder. Next, a complete dissection of the upper aspect of the triangle of Calot was performed and the critical view of safety was achieved.     The cystic artery and cystic duct were clipped securely and divided between clips. The gallbladder was then removed out of its fossa using electrocautery.  It was placed into an Endocatch bag and removed from the abdomen.  Hemostasis was ensured.   The supra umbilical incision was closed using 0 Vicryl suture on a PMI needle passer in figure of eight fashion.  Local anesthesia was administered.      The skin was closed using 4-0 monocryl suture and skin glue were applied.     Dr. Goldman was present for all critical components of the operation and all needle and sponge counts were correct x2 at the end of the procedure.    Cristhian Norman MD  General Surgery, PGY-5

## 2020-09-14 NOTE — PLAN OF CARE
VSS on RA. Denies pain and nausea. Tolerated clear liquid diet, NPO at midnight for possible procedure tomorrow. PIV infusing LR at 100 ml/hr. Up independently. Voiding spontaneously. No bm since Friday. [place] speaking but declined - understood our conversation. Continue to monitor.

## 2020-09-14 NOTE — PROGRESS NOTES
Post Op Check    09/14/2020    Leonidas Hawk is a 40 year old male with h/o choledocolithiasis now POD#0 s/p Laparoscopic Cholecystectomy, Laparoscopic Lysis of Adhesions.    Pt reports feeling abdominal pain and discomfort. Denies SOB, chest pain, or dizziness. No BM. Voiding.    BP (P) 116/58   Pulse (P) 74   Temp 98  F (36.7  C) (Oral)   Resp (P) 14   Wt 99.7 kg (219 lb 12.8 oz)   SpO2 (P) 90%   BMI 30.43 kg/m      Gen: A&O x3, NAD  Chest: breathing non-labored  Abdomen: soft, appropriately tender, mildly distended  Incision: clean, dry, intact  Extremities: warm and well perfused    A/P: No acute post-op issues. Continue plan of care per primary team. Patient did not feel up to being discharged due to pain. Patient will stay overnight and he hopes to be ready for discharge in the AM.     Og Elias MD, PGY1  General Surgery  (6AM-5PM please page primary team, nights/weekends/holidays page job code [phone])

## 2020-09-14 NOTE — ANESTHESIA PREPROCEDURE EVALUATION
"Anesthesia Pre-Procedure Evaluation    Patient: Leonidas Hawk   MRN:     [id number] Gender:   male   Age:    40 year old :      1980        Preoperative Diagnosis: Choledocholithiasis [K80.50]   Procedure(s):  LAPAROSCOPIC  CHOLECYSTECTOMY     LABS:  CBC:   Lab Results   Component Value Date    WBC 5.9 2020    WBC 9.1 2020    HGB 15.3 2020    HGB 16.5 2020    HCT 44.6 2020    HCT 45.7 2020     2020     2020     BMP:   Lab Results   Component Value Date     2020     2020    POTASSIUM 4.0 2020    POTASSIUM 4.0 2020    CHLORIDE 108 2020    CHLORIDE 106 2020    CO2 24 2020    CO2 24 2020    BUN 12 2020    BUN 14 2020    CR 1.01 2020    CR 0.91 2020    GLC 94 2020     (H) 2020     COAGS: No results found for: PTT, INR, FIBR  POC: No results found for: BGM, HCG, HCGS  OTHER:   Lab Results   Component Value Date    LACT 2.1 (H) 2020    GISELLA 8.8 2020    PHOS 2.3 (L) 2020    MAG 2.0 2020    ALBUMIN 3.8 2020    PROTTOTAL 7.6 2020    ALT 65 2020    AST 36 2020    ALKPHOS 72 2020    BILITOTAL 0.8 2020    LIPASE 120 2020        Preop Vitals    BP Readings from Last 3 Encounters:   20 120/64   20 121/78    Pulse Readings from Last 3 Encounters:   20 61   20 79      Resp Readings from Last 3 Encounters:   20 18    SpO2 Readings from Last 3 Encounters:   20 96%   20 97%      Temp Readings from Last 1 Encounters:   20 35.5  C (95.9  F) (Oral)    Ht Readings from Last 1 Encounters:   20 1.81 m (5' 11.26\")      Wt Readings from Last 1 Encounters:   20 101.6 kg (224 lb)    Estimated body mass index is 31.01 kg/m  as calculated from the following:    Height as of 20: 1.81 m (5' 11.26\").    Weight as of 20: 101.6 kg (224 lb). "     LDA:  Peripheral IV 09/12/20 Right (Active)   Site Assessment WDL 09/14/20 0746   Line Status Infusing 09/14/20 0746   Phlebitis Scale 0-->no symptoms 09/14/20 0746   Infiltration Scale 0 09/14/20 0746   Infiltration Site Treatment Method  None 09/14/20 0100   Extravasation? No 09/14/20 0746   Dressing Intervention New dressing  09/13/20 0800   Number of days: 2        History reviewed. No pertinent past medical history.   Past Surgical History:   Procedure Laterality Date     APPENDECTOMY  2000     DENTAL SURGERY        No Known Allergies     Anesthesia Evaluation     .             ROS/MED HX    ENT/Pulmonary:     (+)tobacco use, Current use , . .    Neurologic:  - neg neurologic ROS     Cardiovascular:     (+) Dyslipidemia, ----. : . . . :. .       METS/Exercise Tolerance:     Hematologic:  - neg hematologic  ROS       Musculoskeletal:  - neg musculoskeletal ROS       GI/Hepatic:     (+) cholecystitis/cholelithiasis,       Renal/Genitourinary:  - ROS Renal section negative       Endo:  - neg endo ROS       Psychiatric:  - neg psychiatric ROS       Infectious Disease:  - neg infectious disease ROS       Malignancy:      - no malignancy   Other: Comment: Social ethanol usage.                         PHYSICAL EXAM:   Mental Status/Neuro:    Airway: Facies: Feasible  Mallampati: I  Mouth/Opening: Full  TM distance: > 6 cm  Neck ROM: Full   Respiratory: Auscultation: CTAB     Resp. Rate: Normal     Resp. Effort: Normal      CV: Rhythm: Regular  Rate: Age appropriate  Heart: Normal Sounds  Edema: None   Comments:      Dental: Normal Dentition                Assessment:   ASA SCORE: 2    H&P: History and physical reviewed and following examination; no interval change.         Plan:   Anes. Type:  General   Pre-Medication: None   Induction:  IV (RSI)   Airway: ETT; Oral   Access/Monitoring: PIV   Maintenance: Balanced     Postop Plan:   Postop Pain: Opioids  Postop Sedation/Airway: Not planned  Disposition:  Inpatient/Admit     PONV Management:   Adult Risk Factors:, Postop Opioids     CONSENT: Direct conversation   Plan and risks discussed with: Patient   Blood Products: Consented (ALL Blood Products)                   Christopher J. Behrens, MD

## 2020-09-14 NOTE — ANESTHESIA POSTPROCEDURE EVALUATION
Anesthesia POST Procedure Evaluation    Patient: Leonidas Hawk   MRN:     [id number] Gender:   male   Age:    40 year old :      1980        Preoperative Diagnosis: Choledocholithiasis [K80.50]   Procedure(s):  LAPAROSCOPIC  CHOLECYSTECTOMY  Laparoscopic lysis adhesions   Postop Comments: No value filed.     Anesthesia Type: General          Postop Pain Control: Uneventful            Sign Out: Well controlled pain   PONV: No   Neuro/Psych: Uneventful            Sign Out: Acceptable/Baseline neuro status   Airway/Respiratory: Uneventful            Sign Out: Acceptable/Baseline resp. status   CV/Hemodynamics: Uneventful            Sign Out: Acceptable CV status   Other NRE: NONE   DID A NON-ROUTINE EVENT OCCUR? No         Last Anesthesia Record Vitals:  CRNA VITALS  2020 1513 - 2020 1613      2020             NIBP:  145/87    Pulse:  72    NIBP Mean:  105    Ht Rate:  72    SpO2:  99 %    EKG:  Sinus rhythm          Last PACU Vitals:  Vitals Value Taken Time   /78 2020  5:00 PM   Temp 36.7  C (98  F) 2020  5:00 PM   Pulse 86 2020  5:00 PM   Resp 18 2020  5:00 PM   SpO2 96 % 2020  5:00 PM   Temp src     NIBP 145/87 2020  3:45 PM   Pulse 72 2020  3:45 PM   SpO2 99 % 2020  3:45 PM   Resp     Temp     Ht Rate 72 2020  3:45 PM   Temp 2           Electronically Signed By: Russell Webb MD, 2020, 5:04 PM

## 2020-09-14 NOTE — PLAN OF CARE
Assumed care of Patient from [id number]. AVSS on RA. [place] speaking, interpretor Ipad used. Patient denies pain, IV dilaudid available if needed. Patient denies nausea. NPO, for possible Lap Karina. Pre-scrub/shower complete.  R PIV currently infusing IVMF. Patient reports + flatus, No BM overnight. Patient voiding in adequate amounts, using bedside urinal. Patient up with SBA. Monitor vitals/pain and continue with POC.

## 2020-09-14 NOTE — ANESTHESIA CARE TRANSFER NOTE
Patient: Leonidas Hawk    Procedure(s):  LAPAROSCOPIC  CHOLECYSTECTOMY  Laparoscopic lysis adhesions    Diagnosis: Choledocholithiasis [K80.50]  Diagnosis Additional Information: No value filed.    Anesthesia Type:   General     Note:  Airway :Nasal Cannula  Patient transferred to:PACU  Comments: Alert and exchanging well. VSS. No complaints of pain. Report given to RN. Handoff Report: Identifed the Patient, Identified the Reponsible Provider, Reviewed the pertinent medical history, Discussed the surgical course, Reviewed Intra-OP anesthesia mangement and issues during anesthesia, Set expectations for post-procedure period and Allowed opportunity for questions and acknowledgement of understanding      Vitals: (Last set prior to Anesthesia Care Transfer)    CRNA VITALS  9/14/2020 1513 - 9/14/2020 1548      9/14/2020             NIBP:  145/87    Pulse:  72    NIBP Mean:  105    Ht Rate:  72    SpO2:  99 %    EKG:  Sinus rhythm                Electronically Signed By: FALGUNI Carranza CRNA  September 14, 2020  3:48 PM

## 2020-09-14 NOTE — BRIEF OP NOTE
[facility], [place]    Brief Operative Note    Pre-operative diagnosis: Choledocholithiasis [K80.50]  Post-operative diagnosis Chronic calculous cholecystitis     Procedure: Procedure(s):  LAPAROSCOPIC  CHOLECYSTECTOMY  Laparoscopic lysis adhesions  Surgeon: Surgeon(s) and Role:     * Cortez Goldman MD - Primary     * Cristhian Norman MD - Resident - Assisting     * Adela Barreto MD - Resident - Assisting  Anesthesia: General   Estimated blood loss: 10 ml  Drains: None  Specimens:   ID Type Source Tests Collected by Time Destination   A :  Tissue Gallbladder and Contents SURGICAL PATHOLOGY EXAM Cortez Goldman MD 9/14/2020  3:10 PM      Findings:   None.  Complications: None.  Implants: * No implants in log *

## 2020-09-14 NOTE — PLAN OF CARE
VSS on RA. A&O. [place] speaking. Getting up independently. Pt denies pain, declined scheduled Tylenol. No c/o nausea. NPO since midnight. IVF infusing into PIV @ 100 mL/hr. Pre-op shower completed this AM. Pt went down to pre-op around 1130 for laparoscopic cholecystectomy. Report given to pre-op RN.

## 2020-09-14 NOTE — PHARMACY-ADMISSION MEDICATION HISTORY
Admission medication history interview status for the 9/12/2020 admission is complete. See Epic admission navigator for allergy information, pharmacy, prior to admission medications and immunization status.     Medication history interview sources:  patient, [facility] fill records    Changes made to PTA medication list (reason)  Added: none  Deleted: amoxicillin - old rx, omeprazole - not taking per patient  Changed: none    Additional medication history information (including reliability of information, actions taken by pharmacist):  -Per patient, not taking any prescription medications or OTC supplements, vitamins, topicals, or inhalers      Prior to Admission medications    Not on File     Medication history completed by:     Kylee Arevalo, PharmD, BCCCP

## 2020-09-15 ENCOUNTER — PATIENT OUTREACH (OUTPATIENT)
Dept: CARE COORDINATION | Facility: CLINIC | Age: 40
End: 2020-09-15

## 2020-09-15 VITALS
HEART RATE: 80 BPM | OXYGEN SATURATION: 91 % | SYSTOLIC BLOOD PRESSURE: 111 MMHG | RESPIRATION RATE: 20 BRPM | TEMPERATURE: 96.9 F | WEIGHT: 224.5 LBS | BODY MASS INDEX: 31.08 KG/M2 | DIASTOLIC BLOOD PRESSURE: 57 MMHG

## 2020-09-15 PROCEDURE — 25800030 ZZH RX IP 258 OP 636: Performed by: SURGERY

## 2020-09-15 PROCEDURE — 25000132 ZZH RX MED GY IP 250 OP 250 PS 637: Performed by: STUDENT IN AN ORGANIZED HEALTH CARE EDUCATION/TRAINING PROGRAM

## 2020-09-15 PROCEDURE — 25000132 ZZH RX MED GY IP 250 OP 250 PS 637: Performed by: SURGERY

## 2020-09-15 PROCEDURE — 25000128 H RX IP 250 OP 636: Performed by: SURGERY

## 2020-09-15 RX ORDER — METHOCARBAMOL 500 MG/1
500 TABLET, FILM COATED ORAL 4 TIMES DAILY
Qty: 10 TABLET | Refills: 0 | Status: SHIPPED | OUTPATIENT
Start: 2020-09-15 | End: 2020-09-15

## 2020-09-15 RX ORDER — OXYCODONE HYDROCHLORIDE 5 MG/1
5 TABLET ORAL EVERY 4 HOURS PRN
Status: DISCONTINUED | OUTPATIENT
Start: 2020-09-15 | End: 2020-09-15 | Stop reason: HOSPADM

## 2020-09-15 RX ORDER — METHOCARBAMOL 500 MG/1
500 TABLET, FILM COATED ORAL 4 TIMES DAILY
Qty: 10 TABLET | Refills: 0 | Status: SHIPPED | OUTPATIENT
Start: 2020-09-15 | End: 2020-09-24

## 2020-09-15 RX ORDER — METHOCARBAMOL 500 MG/1
500 TABLET, FILM COATED ORAL 4 TIMES DAILY
Status: DISCONTINUED | OUTPATIENT
Start: 2020-09-15 | End: 2020-09-15 | Stop reason: HOSPADM

## 2020-09-15 RX ORDER — IBUPROFEN 600 MG/1
600 TABLET, FILM COATED ORAL EVERY 6 HOURS PRN
Status: DISCONTINUED | OUTPATIENT
Start: 2020-09-15 | End: 2020-09-15 | Stop reason: HOSPADM

## 2020-09-15 RX ORDER — ACETAMINOPHEN 325 MG/1
975 TABLET ORAL 3 TIMES DAILY
COMMUNITY
Start: 2020-09-15 | End: 2021-01-26

## 2020-09-15 RX ORDER — IBUPROFEN 600 MG/1
600 TABLET, FILM COATED ORAL EVERY 6 HOURS PRN
COMMUNITY
Start: 2020-09-15 | End: 2020-09-24

## 2020-09-15 RX ADMIN — METHOCARBAMOL 500 MG: 500 TABLET, FILM COATED ORAL at 07:29

## 2020-09-15 RX ADMIN — HYDROMORPHONE HYDROCHLORIDE 0.5 MG: 1 INJECTION, SOLUTION INTRAMUSCULAR; INTRAVENOUS; SUBCUTANEOUS at 00:34

## 2020-09-15 RX ADMIN — ACETAMINOPHEN 975 MG: 325 TABLET, FILM COATED ORAL at 07:29

## 2020-09-15 RX ADMIN — OXYCODONE HYDROCHLORIDE 5 MG: 5 TABLET ORAL at 08:44

## 2020-09-15 RX ADMIN — HYDROMORPHONE HYDROCHLORIDE 0.5 MG: 1 INJECTION, SOLUTION INTRAMUSCULAR; INTRAVENOUS; SUBCUTANEOUS at 06:15

## 2020-09-15 RX ADMIN — HYDROMORPHONE HYDROCHLORIDE 0.5 MG: 1 INJECTION, SOLUTION INTRAMUSCULAR; INTRAVENOUS; SUBCUTANEOUS at 04:04

## 2020-09-15 RX ADMIN — SODIUM CHLORIDE, POTASSIUM CHLORIDE, SODIUM LACTATE AND CALCIUM CHLORIDE 1000 ML: 600; 310; 30; 20 INJECTION, SOLUTION INTRAVENOUS at 00:37

## 2020-09-15 ASSESSMENT — PAIN DESCRIPTION - DESCRIPTORS
DESCRIPTORS: CONSTANT
DESCRIPTORS: ACHING

## 2020-09-15 ASSESSMENT — ACTIVITIES OF DAILY LIVING (ADL)
ADLS_ACUITY_SCORE: 10
ADLS_ACUITY_SCORE: 11
ADLS_ACUITY_SCORE: 10

## 2020-09-15 NOTE — DISCHARGE SUMMARY
General Surgery Discharge Summary    Leonidas Hawk MRN# [id number]   YOB: 1980 Age: 40 year old     Date of Admission:  9/12/2020  Date of Discharge::  9/15/2020  Admitting Physician:  Marcello Saavedra MD  Discharge Physician:  Dr. Goldman   Primary Care Physician:        [facility], [facility]          Admission Diagnoses:   Calculus of gallbladder and bile duct with obstruction without cholecystitis [K80.71]  Biliary calculus of other site with obstruction [K80.81]            Discharge Diagnosis:   Chronic cholecystitis          Procedures:   Laparoscopic Cholecystectomy done by Dr. Goldman, Dr. Norman, and Dr. Barreto         Non-operative procedures:   N/A          Consultations:   MEDICATION HISTORY IP PHARMACY CONSULT             Medications Prior to Admission:     No medications prior to admission.            Discharge Medications:      Leonidas Hawk   Home Medication Instructions FABIANO:[id number]    Printed on:09/15/20 0726   Medication Information                      oxyCODONE (ROXICODONE) 5 MG tablet  Take 1 tablet (5 mg) by mouth every 6 hours as needed for pain             Robaxin  OTC Tylenol  OTC ibuprofen  OTC Stool softeners          Day of Discharge Exam   /57 (BP Location: Left arm)   Pulse 80   Temp 96.9  F (36.1  C) (Axillary)   Resp 20   Wt 101.8 kg (224 lb 8 oz)   SpO2 91%   BMI 31.08 kg/m      General:  A&Ox3, NAD  Cardio:   RRR  Chest:   Non labored breathing on RA  Abd:   Soft, non-distended, appropriately TTP, incision c/d/i  Ext:   WWP          Brief History of Illness:   Leonidas Hawk is a 40 year old male w/ distant hx of ex-lap/washout/appendectomy for perforated appendicitis who [place] [facility] [place]Q abdominal pain. Reported RUQ abdominal pain on and off for 1 year. In the past month, this has been present every time he eats. Yesterday at lunch the pain became severe and did not resolve,  prompting him to come to the ED.     ED work up notable for abdominal ultrasound to have cholelithiasis with a stone in the gallbladder neck, without sonographic evidence of acute cholecystitis. No leukocytosis, normal LFTs.         Hospital Course:   The patient was admitted and underwent the above procedure. Intra-op findings c/w chronic calculus cholecystitis.  The patient tolerated the procedure well. There were no complications. Postoperatively the patient was transferred to the general floor for further care. The patient's diet was advanced which patient tolerated. Pain was controlled with oral pain medication and the patient was able to ambulate and void without difficulty. The patient received appropriate education post operatively. On POD 1 the patient was discharged to home with appropriate instructions and follow up. The patient acknowledged understanding and were in agreement with the plan.         Antibiotics Prescribed at Discharge:   None prescribed         Imaging Studies:   No studies require specific follow-up  Results for orders placed or performed during the hospital encounter of 09/12/20   Abdomen US, complete    Narrative    US ABDOMEN COMPLETE 9/12/2020 3:16 PM    CLINICAL HISTORY: several abdominal pain epigastric ruq region.  ?  gallbladder, pancreas, aorta/kidney pathology?, see above.    TECHNIQUE: Complete abdominal ultrasound.    COMPARISON: None.    FINDINGS:    GALLBLADDER: Shadowing calculus in the gallbladder neck. No  gallbladder wall thickening or pericholecystic fluid. Sonographic  Kelly sign was negative.    BILE DUCTS: No biliary dilatation. The common duct measures 5 mm.    LIVER: Increased echogenicity from diffuse fatty infiltration.  Hepatomegaly. No focal mass.    RIGHT KIDNEY: Normal size. Normal echogenicity with no hydronephrosis  or mass.     LEFT KIDNEY: Normal size. Normal echogenicity with no hydronephrosis  or mass.     SPLEEN: Normal.    PANCREAS: The pancreatic  head is within normal limits. The body and  tail are obscured by overlying bowel gas.    AORTA: Normal in caliber.     IVC: Normal where visualized.    No ascites.      Impression    IMPRESSION:  1.  Cholelithiasis with a stone in the gallbladder neck. No  sonographic evidence for acute cholecystitis.  2.  Hepatic steatosis.    TANIA SALAMANCA MD            Final Pathology Result:   Pending at time of discharge         Discharge Instructions:     - No lifting greater than 15 pounds for 8 weeks after surgery.   - You may shower and get incisions wet starting 48 hrs after surgery but do not scrub incisions or submerge wounds (aka, bath, pool, hot tub, ect.) for 2 weeks. Remove outer dressing (if placed) after 48 hrs from surgery. If dermabond was used, avoid applying any lotions or ointments. If steri-strips were used, they will fall off on their own. You may leave open to air or cover with dry gauze if needed for comfort.  - No driving while taking narcotic pain medications.   - If you develop constipation, take stool softeners such as colace or miralax but stop if you develop diarrhea. Wean yourself off of narcotics.   - Call your primary provider to touch base with them regarding your recent admission.   - If you develop any fever/chills, worsening pain, redness, swelling, or drainage from your wound please call (Clinic [phone]).          Follow-Up:     - No follow up required        Home Health Care:   Not needed           Discharge Disposition:   Discharged to home      Condition at discharge: Olaf Barreto MD  Surgery, PGY2  [phone]

## 2020-09-15 NOTE — PLAN OF CARE
Discharge orders placed. PIV removed. Discharge instructions reviewed with patient using Swedish iPad . Pt verbalizes understanding of discharge plan. All questions answered. Pt states he will  medications from discharge pharmacy. Belongings sent home with patient.

## 2020-09-15 NOTE — PLAN OF CARE
/56   Pulse 88   Temp 96.5  F (35.8  C)   Resp 24   Wt 99.7 kg (219 lb 12.8 oz)   SpO2 94%   BMI 30.43 kg/m      Shift: 1393-9285  Reason for Admission: POD#0 s/p Laparoscopic Cholecystectomy, Laparoscopic Lysis of Adhesions.  VS: VSS on 2L NC, capno on  Neuros: A/Ox4, able to make needs known  GI/: No BMs this shift. Voided after surgery.  Nutrition: Tolerating regular diet  Drains/Lines: PIV infusing LR @ 100 mL/hr  Activity: SBA. Pt dangled and stood up at bedside  Pain/Nausea: C/o of abdominal/incisional site- PRN IV Dilaudid given. Denies nausea  Respiratory: Sats >90% on 2L NC, intermittently drops below 90%  Skin: 4 lap sites on abdomen- GINGER, derma bonded   New this shift: Pt came back from OR around 1600.   Plan of care: Plan for discharge tomorrow. Will continue to monitor and follow POC.

## 2020-09-15 NOTE — DISCHARGE INSTRUCTIONS
Discharge Instructions   Activity  - No lifting, pushing, pulling more than 15-20 pounds for 3-4 weeks    Incisions  - The type of wound closure used for your incision:  Dermabond  - You may shower and get incisions wet starting 48 hrs after surgery  - Do not scrub incisions or submerge wounds (e.g. bath, pool, hot tub, etc.) for 2 weeks or until the glue or steri-strips have come off    Medications  - Do not take any additional Tylenol (acetaminophen) while using a narcotic pain medication which includes acetaminophen  - Do not take more than 4,000mg of acetaminophen in any 24 hour period, as this can cause liver damage  - Take ibuprofen as needed for pain.   - Take oxycodone and robaxin for break through pain and please do not drive or operate heavy machinery while taking these medications.   - Take stool softeners such as Senna or Miralax while you are using narcotics, but stop if you develop diarrhea  - Wean yourself off of narcotic pain medications    Follow-Up:  - Call your primary care provider to touch base regarding your recent admission.    - Call or return sooner than your regularly scheduled visit if you develop any of the following: fever >101.5, uncontrolled pain, uncontrolled nausea or vomiting, as well as increased redness, swelling, or drainage from your wound.   -  A nurse from the General Surgery Clinic will contact you within 24 hours, or the next business day, after your discharge from the hospital.  If you have questions or to schedule a follow up appointment please call the General Surgery Clinic at [phone].  Call [phone] and ask to speak with the Surgery resident on call if you are having troubles in the evenings, at night, or on the weekend.

## 2020-09-15 NOTE — PLAN OF CARE
Neuro: A&Ox4.   Cardiac: VSS.   Respiratory: capno and sats WNL. Weaned to room air.  GI/: Good bowel sounds. Denies nausea.  Diet/appetite: Regular diet ordered.  Activity:  Assist of 1 walked hallway.  Pain: At acceptable level on current regimen. Dilaudid IV given with decrease in pain. Oxycodone now ordered.  Skin: 5 laps sites derma bonded.  LDA's: PIV with LR at 100 ml/hr.    Plan: Continue with POC. Notify primary team with changes.

## 2020-09-16 ENCOUNTER — PATIENT OUTREACH (OUTPATIENT)
Dept: SURGERY | Facility: CLINIC | Age: 40
End: 2020-09-16

## 2020-09-16 ENCOUNTER — APPOINTMENT (OUTPATIENT)
Dept: INTERPRETER SERVICES | Facility: CLINIC | Age: 40
End: 2020-09-16
Payer: COMMERCIAL

## 2020-09-16 NOTE — PROGRESS NOTES
Leonidas Healy is a patient of Dr. Cortez Balbuena that underwent laparoscopic cholecystectomy approximately 2 days ago.  Attempted to contact patient via telephone with the use of a  for a status update and review post op teaching.  LM on VM to call office.  Await return call.      Of note:  Pathology:  Pending  Wound:  Skin Sealant  Follow-up:  Routine  Restrictions:  - No strenuous exercise for 3-4 weeks  - No lifting, pushing, pulling more than 15-20 pounds for 3-4 weeks  - Do not strain with bowel movements  - Do not drive until you can press the brake pedal quickly and fully without pain  - Do not operate a motor vehicle while taking narcotic pain medications  New medications:  Oxycodone  Equipment/Supplies:  None      Patient Name: LEONIDAS HEALY   MR#: [id number]   Specimen #: [id number]   Collected: 9/14/2020   Received: 9/14/2020   Reported: 9/17/2020 13:16   Ordering Phy(s): CORTEZ BALBUENA     For improved result formatting, select 'View Enhanced Report Format' under    Linked Documents section.     SPECIMEN(S):   Gallbladder and contents     FINAL DIAGNOSIS:   GALLBLADDER AND CONTENTS, CHOLECYSTECTOMY:   - Chronic cholecystitis with focal calcification   - Cholelithiasis     I have personally reviewed all specimens and/or slides, including the   listed special stains, and used them   with my medical judgement to determine or confirm the final diagnosis.     Electronically signed out by:     Fina España M.D., [facility]

## 2020-09-16 NOTE — LETTER
Patient:  Leonidas Healy  :   1980  MRN:     [id number]        Mr.Miguel Healy  [address] [facility] [address]        2020    Dear ,    We are writing to inform you of your pathology results following your recent gallbladder surgery with Dr. Balbuena on . The results show: chronic cholecystitis with focal calcification and cholelithiasis. This means you had a chronically inflamed gallbladder with some inflammatory scarring of the gallbladder, along with gallstones. This is a completely normal pathology finding and no further follow-up is needed based on the results.    Please contact the [facility] at [phone] with any further questions or concerns.    Sincerely,        PEYTON Kurtz RN/Signed on behalf of Dr. Cortez Balbuena                      Patient Name: LEONIDAS HEALY   MR#: [id number]   Specimen #: [id number]   Collected: 2020   Received: 2020   Reported: 2020 13:16   Ordering Phy(s): CORTEZ BALBUENA     For improved result formatting, select 'View Enhanced Report Format' under    Linked Documents section.     SPECIMEN(S):   Gallbladder and contents     FINAL DIAGNOSIS:   GALLBLADDER AND CONTENTS, CHOLECYSTECTOMY:   - Chronic cholecystitis with focal calcification   - Cholelithiasis     I have personally reviewed all specimens and/or slides, including the   listed special stains, and used them   with my medical judgement to determine or confirm the final diagnosis.     Electronically signed out by:     Fina España M.D., [facility]

## 2020-09-17 LAB — COPATH REPORT: NORMAL

## 2020-09-22 ENCOUNTER — APPOINTMENT (OUTPATIENT)
Dept: INTERPRETER SERVICES | Facility: CLINIC | Age: 40
End: 2020-09-22
Payer: COMMERCIAL

## 2020-09-24 ENCOUNTER — OFFICE VISIT (OUTPATIENT)
Dept: FAMILY MEDICINE | Facility: CLINIC | Age: 40
End: 2020-09-24
Payer: COMMERCIAL

## 2020-09-24 VITALS
WEIGHT: 222 LBS | HEIGHT: 70 IN | HEART RATE: 74 BPM | SYSTOLIC BLOOD PRESSURE: 131 MMHG | DIASTOLIC BLOOD PRESSURE: 73 MMHG | TEMPERATURE: 98.3 F | BODY MASS INDEX: 31.78 KG/M2 | OXYGEN SATURATION: 98 %

## 2020-09-24 DIAGNOSIS — Z23 NEED FOR PROPHYLACTIC VACCINATION AND INOCULATION AGAINST INFLUENZA: ICD-10-CM

## 2020-09-24 DIAGNOSIS — K80.10 CALCULUS OF GALLBLADDER WITH CHRONIC CHOLECYSTITIS WITHOUT OBSTRUCTION: Primary | ICD-10-CM

## 2020-09-24 DIAGNOSIS — N50.3 CYST OF EPIDIDYMIS: ICD-10-CM

## 2020-09-24 PROCEDURE — 90471 IMMUNIZATION ADMIN: CPT | Performed by: FAMILY MEDICINE

## 2020-09-24 PROCEDURE — 99213 OFFICE O/P EST LOW 20 MIN: CPT | Mod: 25 | Performed by: FAMILY MEDICINE

## 2020-09-24 PROCEDURE — 90686 IIV4 VACC NO PRSV 0.5 ML IM: CPT | Performed by: FAMILY MEDICINE

## 2020-09-24 RX ORDER — IBUPROFEN 600 MG/1
600 TABLET, FILM COATED ORAL EVERY 8 HOURS PRN
Qty: 90 TABLET | Refills: 1 | Status: SHIPPED | OUTPATIENT
Start: 2020-09-24 | End: 2021-01-26

## 2020-09-24 RX ORDER — METHOCARBAMOL 500 MG/1
500 TABLET, FILM COATED ORAL 3 TIMES DAILY PRN
Qty: 60 TABLET | Refills: 1 | Status: SHIPPED | OUTPATIENT
Start: 2020-09-24 | End: 2021-01-26

## 2020-09-24 ASSESSMENT — MIFFLIN-ST. JEOR: SCORE: 1929.49

## 2020-09-24 ASSESSMENT — PAIN SCALES - GENERAL: PAINLEVEL: SEVERE PAIN (7)

## 2020-09-24 NOTE — PROGRESS NOTES
"Subjective     Leonidas Hawk is a 40 year old male who presents to clinic today for the following health issues:    HPI   Patient comes in today as a follow-up visit.  He had his gallbladder removed September 14, 2000.  He reports continued use pain medication, methocarbamol, ibuprofen and Tylenol.  He has no nausea, no vomiting.  Denies fever chills no constipation.    Denies lower extremity edema.  Denies being lightheaded or dizzy.    Questionable small lump is been having his left testicular area is not painful, no pain with urination.  Been same size.    Patient is following up on surgery. Patient is also requesting refill of medications.      Review of Systems   Constitutional, HEENT, cardiovascular, pulmonary, gi and gu systems are negative, except as otherwise noted.      Objective    /73 (BP Location: Right arm, Patient Position: Chair, Cuff Size: Adult Large)   Pulse 74   Temp 98.3  F (36.8  C) (Oral)   Ht 1.78 m (5' 10.08\")   Wt 100.7 kg (222 lb)   SpO2 98%   BMI 31.78 kg/m    Body mass index is 31.78 kg/m .  Physical Exam   GENERAL: healthy, alert and no distress  NECK: no adenopathy, no asymmetry, masses, or scars and thyroid normal to palpation  RESP: lungs clear to auscultation - no rales, rhonchi or wheezes  ABDOMEN: bowel sounds normal and well-healed incision      (male): testicles normal without atrophy or masses, no hernias, penis normal without urethral discharge and small cyst. Left testicular / epididymis area.  MS: no gross musculoskeletal defects noted, no edema  SKIN: no suspicious lesions or rashes            Assessment & Plan     Calculus of gallbladder with chronic cholecystitis without obstruction  Continue with current diet modification.  Advance as tolerated.  Continue with current medication as needed.  - methocarbamol (ROBAXIN) 500 MG tablet; Take 1 tablet (500 mg) by mouth 3 times daily as needed for muscle spasms  - ibuprofen (ADVIL/MOTRIN) 600 MG tablet; Take 1 " tablet (600 mg) by mouth every 8 hours as needed for moderate pain With food    Need for prophylactic vaccination and inoculation against influenza    - INFLUENZA VACCINE IM > 6 MONTHS VALENT IIV4 [73037]  - Vaccine Administration, Initial [21328]    Cyst of epididymis  benign, reassured the patient, continue observation.      There are no Patient Instructions on file for this visit.    No follow-ups on file.    Kaela Poole MD  [facility]

## 2020-10-05 ENCOUNTER — OFFICE VISIT (OUTPATIENT)
Dept: FAMILY MEDICINE | Facility: CLINIC | Age: 40
End: 2020-10-05
Payer: COMMERCIAL

## 2020-10-05 VITALS
BODY MASS INDEX: 31.78 KG/M2 | HEART RATE: 74 BPM | OXYGEN SATURATION: 97 % | SYSTOLIC BLOOD PRESSURE: 120 MMHG | DIASTOLIC BLOOD PRESSURE: 74 MMHG | WEIGHT: 222 LBS | TEMPERATURE: 98.1 F

## 2020-10-05 DIAGNOSIS — Z30.09 ENCOUNTER FOR VASECTOMY COUNSELING: ICD-10-CM

## 2020-10-05 DIAGNOSIS — K80.10 CALCULUS OF GALLBLADDER WITH CHRONIC CHOLECYSTITIS WITHOUT OBSTRUCTION: Primary | ICD-10-CM

## 2020-10-05 PROCEDURE — 99214 OFFICE O/P EST MOD 30 MIN: CPT | Performed by: FAMILY MEDICINE

## 2020-10-05 PROCEDURE — T1013 SIGN LANG/ORAL INTERPRETER: HCPCS | Mod: U3

## 2020-10-05 RX ORDER — DIAZEPAM 10 MG
10 TABLET ORAL ONCE
Qty: 1 TABLET | Refills: 0 | Status: SHIPPED | OUTPATIENT
Start: 2020-10-05 | End: 2020-10-05

## 2020-10-05 ASSESSMENT — PAIN SCALES - GENERAL: PAINLEVEL: EXTREME PAIN (8)

## 2020-10-05 NOTE — PATIENT INSTRUCTIONS
Patient Education     Vasectomía sin bisturí  El procedimiento sin bisturí es parecido a la vasectomía tradicional en muchos aspectos, con la diferencia de que no requiere incisiones ni puntos. [place] significa que la recuperación suele ser más rápida.  Hable con trujillo proveedor de atención médica para averiguar cómo prepararse para el procedimiento, y enterarse de los riesgos y las complicaciones.   Otto el procedimiento      Se le pedirá que se desvista y se acueste sobre la melchor de exámenes. Para prevenir infecciones, lo taparán con paños estériles.    Le pondrán inyecciones de anestesia en el escroto o la parte inferior de la torie, para evitar que usted sienta dolor.    Cate vez que surta efecto el anestésico, el médico hará cate punción en el escroto por medio de cate pinza puntiaguda. A través de esta punción se extraen los dos conductos deferentes.    Los conductos se cortan, y posiblemente se elimine cate sección de cada rasheed. Devante vez sienta cate especie de jalón otto kiley proceso.    Los dos extremos cortados se sellan aplicándoles calor (cauterización); también es posible que los liguen o los recorten. La punción se cicatriza de forma natural, sin necesidad de puntos.  Después del procedimiento  Si le dieron medicamentos para relajarse, tendrá que pedir a alguien que lo lleve a casa. Los efectos de la anestesia local comienzan a desaparecer después de aproximadamente cate hora. Las molestias que usted pueda sentir generalmente son muy leves; si le hace falta, tómese un calmante para el dolor.  Otto la recuperación  Por lo general, el tiempo de recuperación de la vasectomía sin bisturí es más corto que el de la vasectomía tradicional. Cate vez que llegue a casa, puede hacer varias cosas para acelerar trujillo recuperación:    El día del procedimiento, evite permanecer de pie, para disminuir la probabilidad de cate hinchazón. También puede mantener a un mínimo la hinchazón usando cate bolsa de hielo.    Póngase  calzoncillos de algodón ajustados, que le sirvan de sostén.    Evite cargar objetos pesados o hacer ejercicios otto 5 días corey mínimo.    Pregunte a trujillo médico cuándo puede volver a trabajar.    Pregúntele a trujillo médico cuándo puede reanudar las relaciones sexuales. Nota: Es preciso que use algún método de control de la natalidad hasta que turjillo médico le informe que usted es estéril.   Date Last Reviewed: 1/1/2017 2000-2019 [employer]. [address] [place] [address], [place] [address]. Todos los derechos reservados. Esta información no pretende sustituir la atención médica profesional. Sólo trujillo médico puede diagnosticar y tratar un problema de shari.

## 2020-10-05 NOTE — PROGRESS NOTES
Subjective     Leonidas Hawk is a 40 year old male who presents to clinic today for the following health issues:    HPI         Concern - Pain from gallbladder surgery  Onset: 9/12/20  Description: Continues to have pain in the stomach,went back to work thinking he could but the pain got worse  Intensity: 8/10  Progression of Symptoms:  worsening  Accompanying Signs & Symptoms:   Previous history of similar problem: Yes  Precipitating factors:        Worsened by: Lying down on right side  Alleviating factors:        Improved by: Not moving  Therapies tried and outcome: Rx's- does not help.    Jaylene Costello MA    Patient reports his symptoms actually got better and so he went back to work in about a week after going back to work he experienced recurrent pain around the 2 incisions in the right upper quadrant.  The umbilical incision and the left upper quadrant incision are doing fine.  There is no erythema around either incision and no drainage.  He has no fever.  It bothers him when he has to use the muscles in the abdomen.  He was given a muscle relaxant by Dr. Poole but has not found this helpful for the pain.  The pain he is having at this time is completely different than what he had prior to the lap micheala.  Also it is different from the postoperative pain that he had, which has resolved completely.    He is also interested in a vasectomy.  He states that he has read about it and would like to get it scheduled.          Review of Systems   Constitutional, HEENT, cardiovascular, pulmonary, gi and gu systems are negative, except as otherwise noted.      Objective    /74 (BP Location: Right arm, Patient Position: Chair, Cuff Size: Adult Large)   Pulse 74   Temp 98.1  F (36.7  C) (Oral)   Wt 100.7 kg (222 lb)   SpO2 97%   BMI 31.78 kg/m    Body mass index is 31.78 kg/m .  Physical Exam   GENERAL: healthy, alert and no distress  EYES: Eyes grossly normal to inspection, PERRL and conjunctivae and  sclerae normal  ABDOMEN: soft, nontender, without hepatosplenomegaly or masses, bowel sounds normal and the incisions are well-healed.  There is some fullness around the 2 incisions in the right upper quadrant and I wonder if he may have developed an abdominal wall hematoma although there is no visible bruising or discoloration.   (male): normal male genitalia without lesions or urethral discharge, no hernia   (male): testicles normal without atrophy or masses, no hernias, penis normal without urethral discharge and both vas deferens were isolated with minimal difficulty.  MS: no gross musculoskeletal defects noted, no edema  NEURO: Normal strength and tone, mentation intact and speech normal  PSYCH: mentation appears normal, affect normal/bright            Assessment & Plan     Calculus of gallbladder with chronic cholecystitis without obstruction  He is redeveloped some kyra-incisional pain for the 2 incisions in the right upper quadrant that was associated with going back to work.  He has strenuous physical labor at work and I suspect he may have had either some bleeding or seroma develop around these incisions internally.  He can stop the methocarbamol and have suggested ibuprofen for pain control.  The abdomen was otherwise soft and I do not think there is any internal process occurring.    Encounter for vasectomy counseling  Vasectomy information was reviewed with the patient.  He was given information in Mexican today which he said he could read.  He was also given information to schedule and prescription for Valium for periprocedural sedation and relaxation.  Advised if he uses the Valium he will need a ride.  - diazepam (VALIUM) 10 MG tablet; Take 1 tablet (10 mg) by mouth once for 1 dose 30-60 minutes before procedure            No follow-ups on file.    Hector Bunch MD  [facility]

## 2020-10-06 ENCOUNTER — TELEPHONE (OUTPATIENT)
Dept: FAMILY MEDICINE | Facility: CLINIC | Age: 40
End: 2020-10-06

## 2020-10-06 ENCOUNTER — APPOINTMENT (OUTPATIENT)
Dept: INTERPRETER SERVICES | Facility: CLINIC | Age: 40
End: 2020-10-06
Payer: COMMERCIAL

## 2020-10-06 NOTE — TELEPHONE ENCOUNTER
Reason for call:  Patient reporting a symptom    Symptom or request: Pain     Duration (how long have symptoms been present): Thursday     Have you been treated for this before? No    Additional comments: Patient's is wanting to speak to nurse regarding pain that he is having after surgery.Please call back with .      Phone Number patient can be reached at:  Other phone number:  [phone]    Best Time:  Anytime    Can we leave a detailed message on this number:  YES    Call taken on 10/6/2020 at 1:54 PM by Heidi Moreland

## 2020-10-07 NOTE — TELEPHONE ENCOUNTER
Called [phone] per request below.    Called using  # [phone].  Left a message with a female to have patient return call to triage.    Kristin CovarrubiasRN,BSN  [facility]

## 2020-10-07 NOTE — TELEPHONE ENCOUNTER
Please call patient to try to get some more information.  I am not familiar with using a phone  to do this.  When I assessed him in the clinic I thought it was some musculoskeletal abdominal wall pain related to the 2 laparoscopic incision sites in the area where he is having discomfort.  I did not see any evidence of infection.  If it has worsened a lot he may require some imaging to the area.    Hector Bunch MD

## 2020-10-08 NOTE — TELEPHONE ENCOUNTER
Attempt # 2  Called using  services.  Did patient answer the phone: No, left a message with a female to have patient return call to triage.    Kristin Covarrubias,RN,BSN  [facility]

## 2020-10-09 NOTE — TELEPHONE ENCOUNTER
Attempt # 3  Called using    Did patient answer the phone: No, left a message on voicemail to return call to triage line at [phone].    Kristin CovarrubiasRN,BSN  [facility]

## 2020-10-12 NOTE — TELEPHONE ENCOUNTER
Will close chart.  Patient has not returned call to triage line.  Kristin CovarrubiasRN,BSN  [facility]

## 2020-11-18 ENCOUNTER — TELEPHONE (OUTPATIENT)
Dept: FAMILY MEDICINE | Facility: CLINIC | Age: 40
End: 2020-11-18

## 2020-11-18 ENCOUNTER — APPOINTMENT (OUTPATIENT)
Dept: INTERPRETER SERVICES | Facility: CLINIC | Age: 40
End: 2020-11-18
Payer: COMMERCIAL

## 2020-11-18 NOTE — TELEPHONE ENCOUNTER
No special preparation is needed.  He will need a ride if he takes the medication to help him relax.  We can check his ears at the visit as well.    Hector Bunch MD

## 2020-11-18 NOTE — TELEPHONE ENCOUNTER
Reason for Call:  Other - Appointment Question    Detailed comments: Corine, patient's spouse, called and stated patient has an appointment for a vasectomy on 11/24. She was calling to find out if there is any preparation patient needs to do before his vasectomy. Also, patient's ears are plugged. Spouse is wondering if Dr. Bunch could also do an ear wash at this appointment on 11/24/20. Please call back with .    Phone Number Patient can be reached at: Corine: [phone]    Best Time: Anytime    Can we leave a detailed message on this number? YES    Call taken on 11/18/2020 at 12:41 PM by Yanira Cordon

## 2020-11-23 NOTE — TELEPHONE ENCOUNTER
Called patient let him be aware of the below concern/questions regarding the procedure for 11/24/2020.  Arabella Elliott MA on 11/23/2020 at 8:46 AM

## 2020-11-24 ENCOUNTER — OFFICE VISIT (OUTPATIENT)
Dept: FAMILY MEDICINE | Facility: CLINIC | Age: 40
End: 2020-11-24
Payer: COMMERCIAL

## 2020-11-24 VITALS
DIASTOLIC BLOOD PRESSURE: 72 MMHG | TEMPERATURE: 97.5 F | BODY MASS INDEX: 33.07 KG/M2 | SYSTOLIC BLOOD PRESSURE: 131 MMHG | HEART RATE: 73 BPM | OXYGEN SATURATION: 97 % | WEIGHT: 231 LBS

## 2020-11-24 DIAGNOSIS — Z30.2 ENCOUNTER FOR VASECTOMY: Primary | ICD-10-CM

## 2020-11-24 PROCEDURE — 55250 REMOVAL OF SPERM DUCT(S): CPT | Performed by: FAMILY MEDICINE

## 2020-11-24 PROCEDURE — 88302 TISSUE EXAM BY PATHOLOGIST: CPT | Performed by: FAMILY MEDICINE

## 2020-11-24 ASSESSMENT — PAIN SCALES - GENERAL: PAINLEVEL: NO PAIN (0)

## 2020-11-24 NOTE — PATIENT INSTRUCTIONS
Patient Education     Vasectomía sin bisturí   La vasectomía es un procedimiento sencillo y seguro para que un hombre no pueda tener hijos, es decir, se vuelve estéril. Es el método anticonceptivo más eficaz en los hombres y nahum efectos son permanentes. Antes de realizarse kiley procedimiento, debe estar seguro de que no desea tener más hijos. Hay procedimientos para revertir la vasectomía, chu son costosos y complejos.  El procedimiento sin bisturí es parecido a la vasectomía tradicional, con la diferencia de que no requiere incisiones ni puntos. [place] significa que la recuperación suele ser más rápida.   Preparativos para el procedimiento  Trujillo proveedor de atención médica le explicará cómo prepararse para la cirugía. Probablemente, se le pedirá que lauri lo siguiente:     Firmar un documento para sami trujillo consentimiento. [place] debe hacerse al menos unos días antes de la operación. Mediante kiley documento, autoriza a trujillo proveedor de atención médica a realizar el procedimiento. Allí, también se indica que, con la vasectomía, no se garantiza la esterilidad. Léalo atentamente. Evacúe todas nahum dudas antes de firmarlo.    No rhonda aspirinas, ibuprofeno ni naproxeno por 2 semanas antes de la cirugía. Estos medicamentos pueden provocar hemorragias luego del procedimiento. Además, infórmele a trujillo proveedor si antonio algún medicamento recetado o de venta julissa, vitaminas, hierbas medicinales u otros suplementos.    También comuníquele si le hicieron alguna cirugía de escroto.    Pedir a un miembro adulto de la ivan o a un amigo que lo lleve a casa después de la operación.    Ducharse y limpiarse el escroto el día de la operación. También es posible que trujillo proveedor le pida que se afeite el escroto.    Llevar suspensorios o un par de calzoncillos cómodos de algodón al consultorio de trujillo proveedor o al hospital.    No comer ni beber nada antes de la cirugía según las indicaciones de trujillo proveedor de atención médica.  José Miguel el  procedimiento    Se le pedirá que se desvista y que se acueste sobre la melchor de examinación. Para prevenir infecciones, lo taparán con paños estériles.    Le pondrán inyecciones de anestesia en el escroto o en la parte inferior de la torie para evitar que sienta dolor.    Cate vez que la anestesia surta efecto, el proveedor de atención médica hará cate punción pequeña en el escroto con cate pinza puntiaguda. Los dos conductos deferentes se extraen de a rasheed a través de kiley orificio.    Estos conductos se cortan y es posible que deba seccionarse un segmento de cada extremo cortado. Devante vez sienta cate especie de jalón otto kiley proceso.    Los dos extremos cortados se sellan mediante calor (cauterización). También pueden ser ligados o unidos. La punción cicatriza de forma natural, sin necesidad de puntos.    Después del procedimiento  Si le dieron medicamentos para relajarse, tendrá que pedir a alguien que lo lleve a casa. Los efectos de la anestesia local comienzan a desaparecer después de aproximadamente cate hora. Cualquier molestia suele ser muy leve. De ser necesario, tómese un calmante.  Otto la recuperación  Por lo general, el tiempo de recuperación de la vasectomía sin bisturí es más corto que el de la vasectomía tradicional. Cate vez que llegue a casa, puede hacer lo siguiente para acelerar trujillo recuperación:    El día del procedimiento, evite permanecer de pie para disminuir la probabilidad de hinchazón.    Use cate bolsa de hielo para reducir la hinchazón. Para hacer cate compresa de hielo, coloque cubitos de hielo en cate bolsa plástica que pueda cerrarse. Envuelva la bolsa en cate toalla o un paño limpio y allan. Nunca aplique hielo o cate compresa de hielo directamente sobre la piel.    Use calzoncillos ajustados de algodón o un suspensorio para mayor soporte.    No levante objetos pesados ni realice ejercicio otto al menos mckinley días.    Pregunte a trujillo proveedor de atención médica cuándo puede volver a trujillo  trabajo.    Pregunte a trujillo proveedor cuándo puede reanudar las relaciones sexuales. Deberá usar algún método anticonceptivo hasta que trujillo proveedor le informe que es estéril.    [id number] Gigi [address]. [address] [place] [id number]. Todos los derechos reservados. Esta información no pretende sustituir la atención médica profesional. Sólo trujillo médico puede diagnosticar y tratar un problema de shari.           Patient Education     Hacerse cate vasectomía: antes, otto y después del procedimiento     The cut ends of the vas may be tied, closed with a clip, or sealed by heat (cauterized).   La vasectomía es un procedimiento ambulatorio. Eso significa que puede volver a trujillo casa sree mismo día. Puede realizarse en un consultorio de un proveedor de atención médica, cate clínica o un hospital. Trujillo proveedor le explicará cómo prepararse para la cirugía. También le explicará los riesgos y las complicaciones posibles. Después del procedimiento, siga todas las recomendaciones que trujillo proveedor le dé para trujillo recuperación.  Preparación para la cirugía  Trujillo proveedor de atención médica le explicará cómo prepararse para la cirugía. Probablemente, le pedirán que lauri lo siguiente:    Firmar un formulario de consentimiento. [place] debe hacerse al menos unos días antes de la operación. Sirve para autorizar a trujillo proveedor de atención médica a realizar el procedimiento. También se indica que, con la vasectomía, no se garantiza la esterilidad.    Evitar rhonda aspirina, ibuprofeno o naproxeno otto  dos semanas antes de la operación. Estos medicamentos pueden provocar hemorragias luego del procedimiento. Informe a trujillo proveedor de atención médica todos los medicamentos que antonio. Incluya los medicamentos recetados, los medicamentos de venta julissa, los productos a base de hierbas, las vitaminas y otros suplementos.    Comunicarle a trujillo proveedor si le hicieron alguna cirugía de escroto.    Pedir a un familiar adulto o a un  amigo que lo lleve a casa después de la operación.    Ducharse y limpiarse el escroto el día de la operación. También es posible que trujillo proveedor le pida que se afeite el escroto.    Llevar suspensorios o un par de calzoncillos cómodos de algodón al consultorio de trujillo proveedor o al hospital.    Seguir las indicaciones que le den sobre no comer ni beber antes de la cirugía.  Otto la cirugía  El procedimiento completo suele durar menos de  30 minutos.    Le pedirán que se quite la ropa y se acueste en cate deborah.    Es posible que le den medicamentos para ayudarlo a relajarse. Para prevenir el dolor otto la operación, le pondrán cate inyección de anestesia local en el escroto o en la parte inferior de la torie.    Cate vez que la geovany está insensibilizada, el proveedor de atención médica realiza cate o dos incisiones pequeñas en el escroto con un bisturí o con cate pinza de puntas agudas (método sin bisturí).    Se extraen los conductos deferentes a través de la incisión y se cortan. El proveedor sella los extremos de los conductos mediante rasheed de los diversos métodos disponibles.    De ser necesario, se irving la incisión con puntos de sutura.    Podrá descansar un rato hasta que esté listo para regresar a trujillo casa.  La recuperación en trujillo casa  Otto alrededor de cate semana, es posible que el escroto tenga un aspecto amoratado y ligeramente hinchado. También puede cruz cate cantidad pequeña de secreción sanguinolenta procedente de la incisión. [place] es normal.  Para que trujillo recuperación sea más cómoda, siga los consejos detallados a continuación.    Otto los  dos primeros días, evite caminar o estar de pie hasta donde le sea posible.    Use un suspensorio o calzoncillos de algodón ajustados para brindar soporte a la geovany.    Use cate compresa de hielo para disminuir la hinchazón. Para hacer cate compresa de hielo, coloque cubitos de hielo en cate bolsa plástica que pueda cerrarse. Envuelva la bolsa en cate toalla o  un paño limpio y allan. Nunca aplique hielo o cate compresa de hielo directamente sobre la piel.    [place] medicamentos con acetaminofén para aliviar cualquier dolor leve. No tome aspirina.    Espere  48 horas antes de bañarse.    No levante objetos pesados ni lauri ejercicio otto, al menos,  siete días.    Pregunte a trujillo proveedor de atención médica cuánto tiempo debe esperar antes de reanudar las relaciones sexuales. Sugerencia: Es imprescindible que use otro método anticonceptivo hasta que se compruebe trujillo esterilidad total.  Cuándo debe recibir atención médica  Llame a trujillo proveedor de atención médica si nota cualquiera de estos síntomas después de la cirugía:    más dolor o hinchazón en el escroto;    cate geovany amoratada aisha o un bulto que aumenta de tamaño;    fiebre;    escalofríos;    mayor enrojecimiento o secreción en los sitios de la incisión;    dificultades para orinar.  Las relaciones sexuales después de la vasectomía  La vasectomía no cambia la función sexual. Por esto, cuando reanude nahum relaciones sexuales, la sensación será la misma que antes. La vasectomía tampoco debería afectar la relación que tiene con trujillo betsey. Sin embargo, es importante recordar que la esterilidad no es inmediata. Pasará tiempo antes de que pueda tener relaciones sexuales sin anticonceptivos.    Hasta que se compruebe trujillo esterilidad: Después de la vasectomía, sigue habiendo algunos espermatozoides activos en el semen. Pasará un tiempo y muchas eyaculaciones antes de que los espermatozoides desaparezcan por completo. Otto kiley período, tendrá que utilizar otro método anticonceptivo para evitar el embarazo. Para asegurarse de que no quedan espermatozoides en trujillo semen, tendrá que hacerse rasheed o varios análisis de semen entre seis y ocho semanas después de la cirugía. Normalmente, obtiene cate muestra de semen en trujillo casa y la lleva a un laboratorio para que la examinen con un microscopio. Será estéril solamente cuando estas  muestras indiquen que trujillo semen no contiene espermatozoides. Consulte a trujillo proveedor de atención médica si necesita más visitas de control.    Después de que se compruebe trujillo esterilidad: Avril vez que el proveedor le diga que usted es estéril, ya no necesitará usar ningún tipo de anticonceptivo y podrá tener relaciones sexuales sin temor de producir un embarazo indeseado. Sin embargo, recuerde que la vasectomía no lo protegerá contra las infecciones de transmisión sexual (ITS). Si tiene relaciones sexuales con más de avril persona, asegúrese de usar siempre condones.    [id number] [employer]. [address] [place] [id number]. Todos los derechos reservados. Esta información no pretende sustituir la atención médica profesional. Sólo trujillo médico puede diagnosticar y tratar un problema de shari.

## 2020-11-24 NOTE — PROGRESS NOTES
Subjective     Leonidas Hawk is a 40 year old male who presents to clinic today for the following health issues:    HPI         Patient here for vasectomy.  Jaylene Costello MA    Patient has been seen previously for consult and wishes to proceed with a vasectomy today.    Procedure note:    After informed consent was obtained we agreed to proceed.  Patient had no other questions.  He was accompanied by his significant other today.  He was placed in the supine position on the operating table.  The penis was retracted away from the scrotum and secured with paper tape.  The scrotum, inner thighs, and perineum were cleansed with an iodine based surgical scrub.  A drape was then placed over the scrotum to complete the operating field.  Attention was turned first to the left side where the vas was isolated with minimal difficulty and brought to the midline.  The skin was anesthetized using 1 to 2 cc of 1% lidocaine without epinephrine.  The vas was grasped with the vasectomy clamp and secured adjacent to the skin.  No scalpel technique was used to create an opening in the skin.  A combination of blunt dissection and cautery was used to isolate the vas deferens.  A 1 cm section of the vas was removed.  Both ends remaining in the patient were cauterized using intraluminal cautery.  No bleeding was noted and the site was allowed to drop back into the scrotum.  Attention was then turned to the right vas deferens which was brought to the previous opening.  1 to 2 cc of 1% lidocaine without epinephrine were instilled for anesthesia.  The vas was grasped with the vasectomy clamp through the previously made opening.  Blunt dissection and cautery was used to isolate the vas deferens.  A 1 cm section was removed and both ends remaining in the patient were cauterized using intraluminal cautery.  There was a small amount of venous bleeding that stopped after application of a clamp.  No bleeding was noted and the site was allowed  to drop back into the scrotum.  The skin was then closed with one 4-0 Vicryl interrupted suture.  Estimated blood loss from the procedure less than 2 cc.  No apparent complications were noted.        Review of Systems         Objective    /72 (BP Location: Left arm, Patient Position: Chair, Cuff Size: Adult Regular)   Pulse 73   Temp 97.5  F (36.4  C) (Oral)   Wt 104.8 kg (231 lb)   SpO2 97%   BMI 33.07 kg/m    Body mass index is 33.07 kg/m .  Physical Exam               Assessment & Plan     Encounter for vasectomy  Vasectomy was completed today.  Information was given to the patient about after cares in Russian.  He was instructed in suture removal in 1 week and given information to contact me if any complications arise over the next few days.  - VASECTOMY UNILAT/BILAT W POSTOP SEMEN  - Semen Analysis Post Vasectomy; Future  - Surgical pathology exam            Return in about 1 week (around 12/1/2020) for suture removal.    Hector Bunch MD  [facility]

## 2020-11-27 LAB — COPATH REPORT: NORMAL

## 2020-12-29 ENCOUNTER — TELEPHONE (OUTPATIENT)
Dept: FAMILY MEDICINE | Facility: CLINIC | Age: 40
End: 2020-12-29

## 2021-01-12 ENCOUNTER — APPOINTMENT (OUTPATIENT)
Dept: INTERPRETER SERVICES | Facility: CLINIC | Age: 41
End: 2021-01-12
Payer: COMMERCIAL

## 2021-01-14 DIAGNOSIS — Z30.2 ENCOUNTER FOR VASECTOMY: Primary | ICD-10-CM

## 2021-01-14 LAB
ABSTINENCE DAYS: 1 DAYS (ref 2–7)
AGGLUTINATION: NO YES/NO
ANALYSIS TEMP - CENTIGRADE: 23 CENTIGRADE
CELL FRAGMENTS: ABNORMAL %
COLLECTION METHOD: ABNORMAL
COLLECTION SITE: ABNORMAL
CONSENT TO RELEASE TO PARTNER: YES
IMMATURE SPERM: ABNORMAL %
IMMOTILE: 45 %
LAB RECEIPT TIME: ABNORMAL
LIQUEFIED: YES YES/NO
NON-PROGRESSIVE MOTILITY: 2 %
PROGRESSIVE MOTILITY: 53 % (ref 32–?)
ROUND CELLS: 0.1 MILLION/ML (ref ?–2)
SPECIMEN CONCENTRATION: 64 MILLION/ML (ref 15–?)
SPECIMEN PH: 8 PH (ref 7.2–?)
SPECIMEN TYPE: ABNORMAL
SPECIMEN VOL UR: 1.5 ML (ref 1.5–?)
TIME OF ANALYSIS: ABNORMAL
TOTAL NUMBER: 96 MILLION (ref 39–?)
TOTAL PROGRESSIVE MOTILE: 51 MILLION (ref 15.6–?)
VISCOUS: NO YES/NO
VITALITY: ABNORMAL % (ref 58–?)
WBC SPECIMEN: ABNORMAL %

## 2021-01-14 PROCEDURE — 89320 SEMEN ANAL VOL/COUNT/MOT: CPT

## 2021-01-18 ENCOUNTER — TELEPHONE (OUTPATIENT)
Dept: FAMILY MEDICINE | Facility: CLINIC | Age: 41
End: 2021-01-18

## 2021-01-18 NOTE — TELEPHONE ENCOUNTER
Please call patient and let him know I have a concern that the vasectomy did not work as intended.  I would advise a follow up in the clinic to discuss options.  Until that time, he will continue to need a back up birth control method    Thanks,  Hector Bunch MD

## 2021-01-18 NOTE — TELEPHONE ENCOUNTER
Spoke with patient with help of  and relayed results and message per provider. Verbalized understanding. Scheduled visit for 2/1/21    Lexis Grey RN   [facility]

## 2021-01-25 ENCOUNTER — APPOINTMENT (OUTPATIENT)
Dept: INTERPRETER SERVICES | Facility: CLINIC | Age: 41
End: 2021-01-25
Payer: COMMERCIAL

## 2021-01-25 NOTE — TELEPHONE ENCOUNTER
Spoke with patients wife, he cannot make in person appt on 2/1, so rescheduled for video visit tomorrow    Lexis Grey RN   [facility]

## 2021-01-26 ENCOUNTER — VIRTUAL VISIT (OUTPATIENT)
Dept: FAMILY MEDICINE | Facility: CLINIC | Age: 41
End: 2021-01-26
Payer: COMMERCIAL

## 2021-01-26 DIAGNOSIS — Z30.09 ENCOUNTER FOR VASECTOMY COUNSELING: Primary | ICD-10-CM

## 2021-01-26 PROCEDURE — 99207 PR NO BILLABLE SERVICE THIS VISIT: CPT | Performed by: FAMILY MEDICINE

## 2021-01-26 PROCEDURE — T1013 SIGN LANG/ORAL INTERPRETER: HCPCS | Mod: U4 | Performed by: FAMILY MEDICINE

## 2021-01-26 RX ORDER — DIAZEPAM 10 MG
10 TABLET ORAL ONCE
Qty: 1 TABLET | Refills: 0 | Status: SHIPPED | OUTPATIENT
Start: 2021-01-26 | End: 2021-01-26

## 2021-01-26 NOTE — PROGRESS NOTES
Leonidas is a 40 year old who is being evaluated via a billable video visit.      How would you like to obtain your AVS? MyChart  If the video visit is dropped, the invitation should be resent by: Text to cell phone: [phone]   Will anyone else be joining your video visit? Yes: Intrepreter. How would they like to receive their invitation? Text to cell phone: Same call-      Video Start Time: 1022  Assessment & Plan     Encounter for vasectomy counseling  Follow-up semen analysis showed that the original procedure has failed.  I would verify this by speaking with the diagnostic andrology lab last week.  I explained this to the patient.  The pathology report had shown 2 separate sections of vas deferens so I suspect that I inadvertently took 2 specimens from the same vas deferens or he may have 3.  This was a video visit today handled through an .  I did give him the option of scheduling with me in the near future for repeat vasectomy.  There would be no charge for this from my perspective.  He needs to use a backup form of birth control in the interim.  I also offered a referral.  He was comfortable scheduling with me in the near future.  - diazepam (VALIUM) 10 MG tablet; Take 1 tablet (10 mg) by mouth once for 1 dose 30-60 minutes before procedure                             No follow-ups on file.    Hector Bunch MD  [facility]    Morelia Lauren is a 40 year old who presents to clinic today for the following health issues  accompanied by his partner:    HPI   Follow-up after Vasectomy -Pt sent in semen specimen and needs to go over test results.    Patient was seen today by video visit with an  (Greek) to discuss recent post vas semen results indicating that the procedure failed.  He is doing well at this time physically.      Review of Systems   Constitutional, HEENT, cardiovascular, pulmonary, gi and gu systems are negative, except as otherwise  noted.      Objective           Vitals:  No vitals were obtained today due to virtual visit.    Physical Exam   GENERAL: Healthy, alert and no distress  EYES: Eyes grossly normal to inspection.  No discharge or erythema, or obvious scleral/conjunctival abnormalities.  RESP: No audible wheeze, cough, or visible cyanosis.  No visible retractions or increased work of breathing.    SKIN: Visible skin clear. No significant rash, abnormal pigmentation or lesions.  NEURO: Cranial nerves grossly intact.  Mentation and speech appropriate for age.  PSYCH: Mentation appears normal, affect normal/bright, judgement and insight intact, normal speech and appearance well-groomed.                Video-Visit Details    Type of service:  Video Visit    Video End Time:1038    Originating Location (pt. Location): Home    Distant Location (provider location):  [facility]     Platform used for Video Visit: Env[employer]ion

## 2021-03-02 ENCOUNTER — TELEPHONE (OUTPATIENT)
Dept: FAMILY MEDICINE | Facility: CLINIC | Age: 41
End: 2021-03-02

## 2021-03-02 NOTE — TELEPHONE ENCOUNTER
Please call patient and see if he is ready to set up a vasectomy.  I can do a Thursday afternoon or I can do it on Friday before lunchtime any Friday in March.  Earlier in the morning after huddles (ending at 9 am) is better.  Will need  to reach patient.    Thanks,  Hector Bunch MD

## 2021-03-08 ENCOUNTER — APPOINTMENT (OUTPATIENT)
Dept: INTERPRETER SERVICES | Facility: CLINIC | Age: 41
End: 2021-03-08
Payer: COMMERCIAL

## 2021-03-08 NOTE — TELEPHONE ENCOUNTER
Called and left message with  to call back to  Schedule.  If dakotah calls back,please transfer to [phone]

## 2021-03-08 NOTE — TELEPHONE ENCOUNTER
Patient called with . Stated they had been on hold for a while and had been transferred to several places already. Please call the patient back at [phone] with an .     Thank you,  Carine Estrella    [facility]

## 2021-03-19 ENCOUNTER — OFFICE VISIT (OUTPATIENT)
Dept: FAMILY MEDICINE | Facility: CLINIC | Age: 41
End: 2021-03-19
Payer: COMMERCIAL

## 2021-03-19 ENCOUNTER — APPOINTMENT (OUTPATIENT)
Dept: FAMILY MEDICINE | Facility: CLINIC | Age: 41
End: 2021-03-19
Payer: COMMERCIAL

## 2021-03-19 VITALS
TEMPERATURE: 97.5 F | HEART RATE: 64 BPM | OXYGEN SATURATION: 99 % | DIASTOLIC BLOOD PRESSURE: 81 MMHG | SYSTOLIC BLOOD PRESSURE: 127 MMHG

## 2021-03-19 DIAGNOSIS — Z30.2 ENCOUNTER FOR VASECTOMY: ICD-10-CM

## 2021-03-19 DIAGNOSIS — Z30.2 ENCOUNTER FOR VASECTOMY: Primary | ICD-10-CM

## 2021-03-19 PROBLEM — S66.922A: Status: ACTIVE | Noted: 2020-12-16

## 2021-03-19 PROBLEM — S64.30XA: Status: ACTIVE | Noted: 2020-12-09

## 2021-03-19 PROBLEM — M79.645 PAIN OF LEFT THUMB: Status: ACTIVE | Noted: 2020-12-09

## 2021-03-19 PROCEDURE — 99207 PR NO CHARGE LOS: CPT | Performed by: FAMILY MEDICINE

## 2021-03-19 NOTE — LETTER
21    Demographic Information on Leonidas Hawk:    Leonidas Hawk  Gender: male  : 1980  [address] [facility] [address]  [phone] (home)     Please excuse patient from work on 21 and possibly 3/22/21 for recuperation from a procedure.      Hector Bunch MD

## 2021-03-19 NOTE — Clinical Note
Cj, this is a repeat procedure where I did the first vasectomy but it did not work.  When patients see me for the repeat, I do not charge my professional fee.  That is why this visit is coded as a No LOS.  Let me know if you have any questions.  Hector Bunch MD

## 2021-03-19 NOTE — PROGRESS NOTES
Patient is here for elective sterilization by vasectomy.  He has been seen previously for consultation and has had all questions answered previously through an .  He wishes to proceed today.    Procedure note:    After informed consent was obtained from the patient we elected to proceed.  Patient was placed in the supine position on the operating table.  I completed a physical examination and was only able to find to vas deferens.  Otherwise the  examination was normal.  This is a repeat procedure.    The penis was retracted away from the scrotum and secured with paper tape.  The scrotum, inner thighs, and perineum were cleaned with an iodine based surgical scrub.  A drape was then placed over the scrotum to complete the operating field.  Attention was turned first to the left vas deferens.  This was isolated with minimal difficulty and brought to the midline.  Skin was rather thick.  Local anesthesia with 1% lidocaine was placed.  I was unable to secure the vas through the skin with the vasectomy clamp so a small incision was made with a scalpel and I was subsequently able to secure the vas with the vasectomy clamp without difficulty.  Combination of cautery and blunt dissection was used to isolate the vas deferens.  1 cm section was removed.  The prostatic and testicular ends remaining in the patient were then closed with intraluminal cautery.  No bleeding was noted and the site was allowed to drop back into the scrotum.  Attention was then turned to the right side where the procedure was completed in an identical fashion.  This was done through a separate incision.  Intraluminal cautery was used to close both ends remaining in the patient.  No bleeding was noted and the site was allowed to drop back into the scrotum.  Each of the 2 separate incisions in the skin was then closed with one 4-0 Vicryl suture each.  No apparent complications were noted from the procedure.  Estimated blood loss less than 2  cc.  2 specimens were sent to pathology.    1. Encounter for vasectomy  Procedure was completed as noted above.  After cares were reviewed with the patient.  Suture removal can be performed on his own in 1 week.  He was given information to contact me should any complications arise.  Future order was placed for postvasectomy semen analysis.  - VASECTOMY UNILAT/BILAT W POSTOP SEMEN  - Surgical pathology exam; Future  - Semen Analysis Post Vasectomy; Future      Hector Bunch MD

## 2021-03-23 LAB — COPATH REPORT: NORMAL

## (undated) DEVICE — SUCTION MANIFOLD DORNOCH ULTRA CART UL-CL500

## (undated) DEVICE — SOL WATER IRRIG 1000ML BOTTLE 2F7114

## (undated) DEVICE — ENDO TROCAR FIRST ENTRY KII FIOS Z-THRD 05X100MM CTF03

## (undated) DEVICE — ENDO TROCAR BLUNT TIP KII BALLOON 12X130MM C0R50

## (undated) DEVICE — NDL INSUFFLATION 13GA 120MM C2201

## (undated) DEVICE — LIGHT HANDLE X1 31140133

## (undated) DEVICE — LINEN TOWEL PACK X5 5464

## (undated) DEVICE — ESU PENCIL W/COATED BLADE E2450H

## (undated) DEVICE — GLOVE ESTEEM BLUE W/NEU-THERA 7.5  2D73PB75

## (undated) DEVICE — PREP CHLORAPREP 26ML TINTED ORANGE  260815

## (undated) DEVICE — ANTIFOG SOLUTION W/FOAM PAD 31142527

## (undated) DEVICE — DECANTER VIAL 2006S

## (undated) DEVICE — ENDO DISSECTOR BLUNT 05MM  BTD05

## (undated) DEVICE — ESU ENDO SCISSORS 5MM CVD 5DCS

## (undated) DEVICE — ENDO POUCH UNIV RETRIEVAL SYSTEM INZII 10MM CD001

## (undated) DEVICE — DRAPE IOBAN INCISE 23X17" 6650EZ

## (undated) DEVICE — ESU GROUND PAD ADULT W/CORD E7507

## (undated) DEVICE — DEVICE SUTURE PASSER 14GA WECK EFX EFXSP2

## (undated) DEVICE — ENDO TROCAR FIRST ENTRY KII FIOS Z-THRD 12X100MM CTF73

## (undated) DEVICE — ENDO TROCAR SLEEVE KII Z-THREADED 05X100MM CTS02

## (undated) DEVICE — ADH SKIN CLOSURE PREMIERPRO EXOFIN 1.0ML 3470

## (undated) DEVICE — GLOVE PROTEXIS W/NEU-THERA 7.0  2D73TE70

## (undated) DEVICE — CLIP APPLIER ENDO 5MM M/L LIGAMAX EL5ML

## (undated) DEVICE — Device

## (undated) DEVICE — SU MONOCRYL 4-0 PS-2 27" UND Y426H

## (undated) DEVICE — COVER CAMERA IN-LIGHT DISP LT-C02

## (undated) DEVICE — SU VICRYL 0 UR-6 27" J603H

## (undated) DEVICE — LINEN TOWEL PACK X6 WHITE 5487

## (undated) RX ORDER — CEFAZOLIN SODIUM 2 G/100ML
INJECTION, SOLUTION INTRAVENOUS
Status: DISPENSED
Start: 2020-09-14

## (undated) RX ORDER — FENTANYL CITRATE 50 UG/ML
INJECTION, SOLUTION INTRAMUSCULAR; INTRAVENOUS
Status: DISPENSED
Start: 2020-09-14

## (undated) RX ORDER — LIDOCAINE HYDROCHLORIDE 20 MG/ML
INJECTION, SOLUTION EPIDURAL; INFILTRATION; INTRACAUDAL; PERINEURAL
Status: DISPENSED
Start: 2020-09-14

## (undated) RX ORDER — GLYCOPYRROLATE 0.2 MG/ML
INJECTION, SOLUTION INTRAMUSCULAR; INTRAVENOUS
Status: DISPENSED
Start: 2020-09-14

## (undated) RX ORDER — ACETAMINOPHEN 325 MG/1
TABLET ORAL
Status: DISPENSED
Start: 2020-09-14

## (undated) RX ORDER — PROPOFOL 10 MG/ML
INJECTION, EMULSION INTRAVENOUS
Status: DISPENSED
Start: 2020-09-14

## (undated) RX ORDER — FENTANYL CITRATE-0.9 % NACL/PF 10 MCG/ML
PLASTIC BAG, INJECTION (ML) INTRAVENOUS
Status: DISPENSED
Start: 2020-09-14

## (undated) RX ORDER — HYDROMORPHONE HYDROCHLORIDE 1 MG/ML
INJECTION, SOLUTION INTRAMUSCULAR; INTRAVENOUS; SUBCUTANEOUS
Status: DISPENSED
Start: 2020-09-14

## (undated) RX ORDER — DEXAMETHASONE SODIUM PHOSPHATE 4 MG/ML
INJECTION, SOLUTION INTRA-ARTICULAR; INTRALESIONAL; INTRAMUSCULAR; INTRAVENOUS; SOFT TISSUE
Status: DISPENSED
Start: 2020-09-14

## (undated) RX ORDER — LIDOCAINE HYDROCHLORIDE AND EPINEPHRINE 10; 10 MG/ML; UG/ML
INJECTION, SOLUTION INFILTRATION; PERINEURAL
Status: DISPENSED
Start: 2020-09-14

## (undated) RX ORDER — SODIUM CHLORIDE, SODIUM LACTATE, POTASSIUM CHLORIDE, CALCIUM CHLORIDE 600; 310; 30; 20 MG/100ML; MG/100ML; MG/100ML; MG/100ML
INJECTION, SOLUTION INTRAVENOUS
Status: DISPENSED
Start: 2020-09-14